# Patient Record
Sex: MALE | Race: WHITE | Employment: OTHER | URBAN - METROPOLITAN AREA
[De-identification: names, ages, dates, MRNs, and addresses within clinical notes are randomized per-mention and may not be internally consistent; named-entity substitution may affect disease eponyms.]

---

## 2018-09-24 ENCOUNTER — APPOINTMENT (OUTPATIENT)
Dept: CT IMAGING | Age: 78
DRG: 871 | End: 2018-09-24
Payer: MEDICARE

## 2018-09-24 ENCOUNTER — HOSPITAL ENCOUNTER (INPATIENT)
Age: 78
LOS: 8 days | Discharge: NURSING, REHAB OR LONG-TERM CARE | DRG: 871 | End: 2018-10-03
Attending: EMERGENCY MEDICINE | Admitting: INTERNAL MEDICINE
Payer: MEDICARE

## 2018-09-24 ENCOUNTER — APPOINTMENT (OUTPATIENT)
Dept: GENERAL RADIOLOGY | Age: 78
DRG: 871 | End: 2018-09-24
Payer: MEDICARE

## 2018-09-24 DIAGNOSIS — R41.82 ALTERED MENTAL STATUS, UNSPECIFIED ALTERED MENTAL STATUS TYPE: Primary | ICD-10-CM

## 2018-09-24 DIAGNOSIS — R40.4 TRANSIENT ALTERATION OF AWARENESS: ICD-10-CM

## 2018-09-24 LAB
ABO/RH: NORMAL
ABO/RH: NORMAL
ACETAMINOPHEN LEVEL: <5 UG/ML (ref 10–30)
ALBUMIN SERPL-MCNC: 2.8 G/DL (ref 3.4–5)
ALP BLD-CCNC: 121 U/L (ref 40–129)
ALT SERPL-CCNC: 22 U/L (ref 10–40)
AMMONIA: 50 UMOL/L (ref 16–60)
AMPHETAMINE SCREEN, URINE: NORMAL
ANTIBODY SCREEN: NORMAL
AST SERPL-CCNC: 75 U/L (ref 15–37)
B-TYPE NATRIURETIC PEPTIDE: 87 PG/ML (ref 0–99.9)
BARBITURATE SCREEN URINE: NORMAL
BASE EXCESS VENOUS: -13 (ref -3–3)
BASOPHILS ABSOLUTE: 0.1 K/UL (ref 0–0.2)
BASOPHILS RELATIVE PERCENT: 0.7 %
BENZODIAZEPINE SCREEN, URINE: NORMAL
BILIRUB SERPL-MCNC: 0.6 MG/DL (ref 0–1)
BILIRUBIN DIRECT: <0.2 MG/DL (ref 0–0.3)
BILIRUBIN URINE: ABNORMAL MG/DL
BILIRUBIN, INDIRECT: ABNORMAL MG/DL (ref 0–1)
BLOOD, URINE: ABNORMAL
CALCIUM IONIZED: 0.8 MMOL/L (ref 1.12–1.32)
CANNABINOID SCREEN URINE: NORMAL
CLARITY: ABNORMAL
CO2: 15 MMOL/L (ref 21–32)
COCAINE METABOLITE SCREEN URINE: NORMAL
COLOR: ABNORMAL
EOSINOPHILS ABSOLUTE: 0 K/UL (ref 0–0.6)
EOSINOPHILS RELATIVE PERCENT: 0 %
EPITHELIAL CELLS, UA: ABNORMAL /HPF
ETHANOL: NORMAL MG/DL (ref 0–0.08)
GFR AFRICAN AMERICAN: >60
GFR NON-AFRICAN AMERICAN: 59
GLUCOSE BLD-MCNC: 70 MG/DL (ref 70–99)
GLUCOSE URINE: 100 MG/DL
HCO3 VENOUS: 13.5 MMOL/L (ref 23–29)
HCT VFR BLD CALC: 45 % (ref 40.5–52.5)
HEMOGLOBIN: 14.4 G/DL (ref 13.5–17.5)
INR BLD: 0.93 (ref 0.86–1.14)
KETONES, URINE: >=160 MG/DL
LACTATE: 2.21 MMOL/L (ref 0.4–2)
LEUKOCYTE ESTERASE, URINE: NEGATIVE
LIPASE: 11 U/L (ref 13–60)
LYMPHOCYTES ABSOLUTE: 0.5 K/UL (ref 1–5.1)
LYMPHOCYTES RELATIVE PERCENT: 2.3 %
Lab: NORMAL
MAGNESIUM: 1.5 MG/DL (ref 1.8–2.4)
MCH RBC QN AUTO: 35.6 PG (ref 26–34)
MCHC RBC AUTO-ENTMCNC: 31.9 G/DL (ref 31–36)
MCV RBC AUTO: 111.4 FL (ref 80–100)
METHADONE SCREEN, URINE: NORMAL
MICROSCOPIC EXAMINATION: YES
MONOCYTES ABSOLUTE: 1.1 K/UL (ref 0–1.3)
MONOCYTES RELATIVE PERCENT: 5.2 %
NEUTROPHILS ABSOLUTE: 19.1 K/UL (ref 1.7–7.7)
NEUTROPHILS RELATIVE PERCENT: 91.8 %
NITRITE, URINE: NEGATIVE
O2 SAT, VEN: 46 %
OPIATE SCREEN URINE: NORMAL
OXYCODONE URINE: NORMAL
PCO2, VEN: 28.9 MM HG (ref 40–50)
PDW BLD-RTO: 15.1 % (ref 12.4–15.4)
PERFORMED ON: ABNORMAL
PERFORMED ON: ABNORMAL
PERFORMED ON: NORMAL
PERFORMED ON: NORMAL
PH UA: 5.5
PH UA: 6
PH VENOUS: 7.28 (ref 7.35–7.45)
PHENCYCLIDINE SCREEN URINE: NORMAL
PLATELET # BLD: 288 K/UL (ref 135–450)
PMV BLD AUTO: 8.6 FL (ref 5–10.5)
PO2, VEN: 28 MM HG
POC ANION GAP: 27 (ref 10–20)
POC BUN: 33 MG/DL (ref 7–18)
POC CHLORIDE: 101 MMOL/L (ref 99–110)
POC CREATININE: 1.2 MG/DL (ref 0.8–1.3)
POC POTASSIUM: 5.4 MMOL/L (ref 3.5–5.1)
POC SAMPLE TYPE: ABNORMAL
POC SAMPLE TYPE: ABNORMAL
POC SAMPLE TYPE: NORMAL
POC SAMPLE TYPE: NORMAL
POC SODIUM: 143 MMOL/L (ref 136–145)
POC TROPONIN I: 0.03 NG/ML (ref 0–0.1)
POTASSIUM SERPL-SCNC: 3.7 MMOL/L (ref 3.5–5.1)
PROPOXYPHENE SCREEN: NORMAL
PROTEIN UA: 100 MG/DL
PROTHROMBIN TIME: 10.6 SEC (ref 9.8–13)
RBC # BLD: 4.04 M/UL (ref 4.2–5.9)
RBC UA: ABNORMAL /HPF (ref 0–2)
SALICYLATE, SERUM: <0.3 MG/DL (ref 15–30)
SPECIFIC GRAVITY UA: 1.02
TCO2 CALC VENOUS: 14 MMOL/L
TOTAL CK: 1819 U/L (ref 39–308)
TOTAL PROTEIN: 5.6 G/DL (ref 6.4–8.2)
UROBILINOGEN, URINE: 0.2 E.U./DL
WBC # BLD: 20.8 K/UL (ref 4–11)
WBC UA: ABNORMAL /HPF (ref 0–5)

## 2018-09-24 PROCEDURE — 80047 BASIC METABLC PNL IONIZED CA: CPT

## 2018-09-24 PROCEDURE — 86850 RBC ANTIBODY SCREEN: CPT

## 2018-09-24 PROCEDURE — 71260 CT THORAX DX C+: CPT

## 2018-09-24 PROCEDURE — 96367 TX/PROPH/DG ADDL SEQ IV INF: CPT

## 2018-09-24 PROCEDURE — 2580000003 HC RX 258: Performed by: EMERGENCY MEDICINE

## 2018-09-24 PROCEDURE — 6360000004 HC RX CONTRAST MEDICATION: Performed by: EMERGENCY MEDICINE

## 2018-09-24 PROCEDURE — 96365 THER/PROPH/DIAG IV INF INIT: CPT

## 2018-09-24 PROCEDURE — 83690 ASSAY OF LIPASE: CPT

## 2018-09-24 PROCEDURE — G0480 DRUG TEST DEF 1-7 CLASSES: HCPCS

## 2018-09-24 PROCEDURE — 85610 PROTHROMBIN TIME: CPT

## 2018-09-24 PROCEDURE — 82140 ASSAY OF AMMONIA: CPT

## 2018-09-24 PROCEDURE — 71045 X-RAY EXAM CHEST 1 VIEW: CPT

## 2018-09-24 PROCEDURE — 87040 BLOOD CULTURE FOR BACTERIA: CPT

## 2018-09-24 PROCEDURE — 80076 HEPATIC FUNCTION PANEL: CPT

## 2018-09-24 PROCEDURE — 99291 CRITICAL CARE FIRST HOUR: CPT

## 2018-09-24 PROCEDURE — 81001 URINALYSIS AUTO W/SCOPE: CPT

## 2018-09-24 PROCEDURE — 84484 ASSAY OF TROPONIN QUANT: CPT

## 2018-09-24 PROCEDURE — 6360000002 HC RX W HCPCS: Performed by: EMERGENCY MEDICINE

## 2018-09-24 PROCEDURE — 74177 CT ABD & PELVIS W/CONTRAST: CPT

## 2018-09-24 PROCEDURE — 83735 ASSAY OF MAGNESIUM: CPT

## 2018-09-24 PROCEDURE — 83880 ASSAY OF NATRIURETIC PEPTIDE: CPT

## 2018-09-24 PROCEDURE — 83605 ASSAY OF LACTIC ACID: CPT

## 2018-09-24 PROCEDURE — 36415 COLL VENOUS BLD VENIPUNCTURE: CPT

## 2018-09-24 PROCEDURE — 82803 BLOOD GASES ANY COMBINATION: CPT

## 2018-09-24 PROCEDURE — 87086 URINE CULTURE/COLONY COUNT: CPT

## 2018-09-24 PROCEDURE — 86900 BLOOD TYPING SEROLOGIC ABO: CPT

## 2018-09-24 PROCEDURE — 84132 ASSAY OF SERUM POTASSIUM: CPT

## 2018-09-24 PROCEDURE — 85025 COMPLETE CBC W/AUTO DIFF WBC: CPT

## 2018-09-24 PROCEDURE — 70450 CT HEAD/BRAIN W/O DYE: CPT

## 2018-09-24 PROCEDURE — 80307 DRUG TEST PRSMV CHEM ANLYZR: CPT

## 2018-09-24 PROCEDURE — 86901 BLOOD TYPING SEROLOGIC RH(D): CPT

## 2018-09-24 PROCEDURE — 93005 ELECTROCARDIOGRAM TRACING: CPT | Performed by: EMERGENCY MEDICINE

## 2018-09-24 PROCEDURE — 82550 ASSAY OF CK (CPK): CPT

## 2018-09-24 PROCEDURE — 96368 THER/DIAG CONCURRENT INF: CPT

## 2018-09-24 RX ORDER — 0.9 % SODIUM CHLORIDE 0.9 %
30 INTRAVENOUS SOLUTION INTRAVENOUS ONCE
Status: COMPLETED | OUTPATIENT
Start: 2018-09-24 | End: 2018-09-25

## 2018-09-24 RX ORDER — MAGNESIUM SULFATE IN WATER 40 MG/ML
2 INJECTION, SOLUTION INTRAVENOUS ONCE
Status: COMPLETED | OUTPATIENT
Start: 2018-09-24 | End: 2018-09-25

## 2018-09-24 RX ORDER — SODIUM CHLORIDE 0.9 % (FLUSH) 0.9 %
10 SYRINGE (ML) INJECTION EVERY 12 HOURS SCHEDULED
Status: DISCONTINUED | OUTPATIENT
Start: 2018-09-24 | End: 2018-09-26 | Stop reason: SDUPTHER

## 2018-09-24 RX ORDER — SODIUM CHLORIDE 0.9 % (FLUSH) 0.9 %
10 SYRINGE (ML) INJECTION PRN
Status: DISCONTINUED | OUTPATIENT
Start: 2018-09-24 | End: 2018-09-26 | Stop reason: SDUPTHER

## 2018-09-24 RX ADMIN — SODIUM CHLORIDE 1803 ML: 9 INJECTION, SOLUTION INTRAVENOUS at 23:05

## 2018-09-24 RX ADMIN — PIPERACILLIN SODIUM AND TAZOBACTAM SODIUM 3.38 G: 3; .375 INJECTION, POWDER, LYOPHILIZED, FOR SOLUTION INTRAVENOUS at 23:06

## 2018-09-24 RX ADMIN — VANCOMYCIN HYDROCHLORIDE 1250 MG: 10 INJECTION, POWDER, LYOPHILIZED, FOR SOLUTION INTRAVENOUS at 23:53

## 2018-09-24 RX ADMIN — MAGNESIUM SULFATE HEPTAHYDRATE 2 G: 40 INJECTION, SOLUTION INTRAVENOUS at 23:53

## 2018-09-24 RX ADMIN — IOPAMIDOL 80 ML: 755 INJECTION, SOLUTION INTRAVENOUS at 23:25

## 2018-09-25 PROBLEM — A41.9 SEPSIS (HCC): Status: ACTIVE | Noted: 2018-09-25

## 2018-09-25 LAB
ALBUMIN SERPL-MCNC: 2.3 G/DL (ref 3.4–5)
ALBUMIN SERPL-MCNC: 2.5 G/DL (ref 3.4–5)
ALBUMIN SERPL-MCNC: 2.6 G/DL (ref 3.4–5)
ALP BLD-CCNC: 92 U/L (ref 40–129)
ALT SERPL-CCNC: 21 U/L (ref 10–40)
ANION GAP SERPL CALCULATED.3IONS-SCNC: 19 MMOL/L (ref 3–16)
ANION GAP SERPL CALCULATED.3IONS-SCNC: 37 MMOL/L (ref 3–16)
ANION GAP SERPL CALCULATED.3IONS-SCNC: 38 MMOL/L (ref 3–16)
ANION GAP SERPL CALCULATED.3IONS-SCNC: 39 MMOL/L (ref 3–16)
AST SERPL-CCNC: 63 U/L (ref 15–37)
BASE EXCESS ARTERIAL: -12.3 MMOL/L (ref -3–3)
BASOPHILS ABSOLUTE: 0 K/UL (ref 0–0.2)
BASOPHILS ABSOLUTE: 0 K/UL (ref 0–0.2)
BASOPHILS RELATIVE PERCENT: 0.1 %
BASOPHILS RELATIVE PERCENT: 0.3 %
BETA-HYDROXYBUTYRATE: >8 MMOL/L (ref 0–0.27)
BILIRUB SERPL-MCNC: 0.5 MG/DL (ref 0–1)
BILIRUBIN DIRECT: <0.2 MG/DL (ref 0–0.3)
BILIRUBIN, INDIRECT: ABNORMAL MG/DL (ref 0–1)
BUN BLDV-MCNC: 25 MG/DL (ref 7–20)
BUN BLDV-MCNC: 26 MG/DL (ref 7–20)
BUN BLDV-MCNC: 26 MG/DL (ref 7–20)
BUN BLDV-MCNC: 27 MG/DL (ref 7–20)
CALCIUM IONIZED: 0.8 MMOL/L (ref 1.12–1.32)
CALCIUM IONIZED: 0.93 MMOL/L (ref 1.12–1.32)
CALCIUM SERPL-MCNC: 6.4 MG/DL (ref 8.3–10.6)
CALCIUM SERPL-MCNC: 6.8 MG/DL (ref 8.3–10.6)
CALCIUM SERPL-MCNC: 6.9 MG/DL (ref 8.3–10.6)
CALCIUM SERPL-MCNC: 6.9 MG/DL (ref 8.3–10.6)
CARBOXYHEMOGLOBIN ARTERIAL: 2.3 % (ref 0–1.5)
CHLORIDE BLD-SCNC: 96 MMOL/L (ref 99–110)
CHLORIDE BLD-SCNC: 97 MMOL/L (ref 99–110)
CHOLESTEROL, TOTAL: 78 MG/DL (ref 0–199)
CO2: 12 MMOL/L (ref 21–32)
CO2: 12 MMOL/L (ref 21–32)
CO2: 14 MMOL/L (ref 21–32)
CO2: 29 MMOL/L (ref 21–32)
CREAT SERPL-MCNC: 1.5 MG/DL (ref 0.8–1.3)
CREATININE URINE: 78 MG/DL (ref 39–259)
EKG ATRIAL RATE: 118 BPM
EKG ATRIAL RATE: 127 BPM
EKG DIAGNOSIS: NORMAL
EKG DIAGNOSIS: NORMAL
EKG P AXIS: 82 DEGREES
EKG P-R INTERVAL: 128 MS
EKG Q-T INTERVAL: 302 MS
EKG Q-T INTERVAL: 456 MS
EKG QRS DURATION: 70 MS
EKG QRS DURATION: 74 MS
EKG QTC CALCULATION (BAZETT): 438 MS
EKG QTC CALCULATION (BAZETT): 639 MS
EKG R AXIS: 62 DEGREES
EKG R AXIS: 80 DEGREES
EKG T AXIS: -78 DEGREES
EKG T AXIS: 19 DEGREES
EKG VENTRICULAR RATE: 118 BPM
EKG VENTRICULAR RATE: 127 BPM
EOSINOPHILS ABSOLUTE: 0 K/UL (ref 0–0.6)
EOSINOPHILS ABSOLUTE: 0 K/UL (ref 0–0.6)
EOSINOPHILS RELATIVE PERCENT: 0 %
EOSINOPHILS RELATIVE PERCENT: 0 %
FOLATE: 12.66 NG/ML (ref 4.78–24.2)
FOLATE: 13.38 NG/ML (ref 4.78–24.2)
GAMMA GLUTAMYL TRANSFERASE: 21 U/L (ref 8–61)
GFR AFRICAN AMERICAN: 55
GFR NON-AFRICAN AMERICAN: 45
GLUCOSE BLD-MCNC: 102 MG/DL (ref 70–99)
GLUCOSE BLD-MCNC: 122 MG/DL (ref 70–99)
GLUCOSE BLD-MCNC: 148 MG/DL (ref 70–99)
GLUCOSE BLD-MCNC: 148 MG/DL (ref 70–99)
GLUCOSE BLD-MCNC: 156 MG/DL (ref 70–99)
GLUCOSE BLD-MCNC: 161 MG/DL (ref 70–99)
GLUCOSE BLD-MCNC: 204 MG/DL (ref 70–99)
GLUCOSE BLD-MCNC: 98 MG/DL (ref 70–99)
GLUCOSE URINE: 4 MG/DL (ref 6–20)
HCO3 ARTERIAL: 11 MMOL/L (ref 21–29)
HCT VFR BLD CALC: 38 % (ref 40.5–52.5)
HCT VFR BLD CALC: 38.4 % (ref 40.5–52.5)
HDLC SERPL-MCNC: 28 MG/DL (ref 40–60)
HEMOGLOBIN, ART, EXTENDED: 13.5 G/DL
HEMOGLOBIN: 12.9 G/DL (ref 13.5–17.5)
HEMOGLOBIN: 13 G/DL (ref 13.5–17.5)
LACTATE: 1.51 MMOL/L (ref 0.4–2)
LDL CHOLESTEROL CALCULATED: 29 MG/DL
LV EF: 65 %
LVEF MODALITY: NORMAL
LYMPHOCYTES ABSOLUTE: 0.5 K/UL (ref 1–5.1)
LYMPHOCYTES ABSOLUTE: 0.7 K/UL (ref 1–5.1)
LYMPHOCYTES RELATIVE PERCENT: 3.2 %
LYMPHOCYTES RELATIVE PERCENT: 4.8 %
MAGNESIUM: 1.9 MG/DL (ref 1.8–2.4)
MAGNESIUM: 2.2 MG/DL (ref 1.8–2.4)
MAGNESIUM: 2.7 MG/DL (ref 1.8–2.4)
MCH RBC QN AUTO: 35.9 PG (ref 26–34)
MCH RBC QN AUTO: 36.1 PG (ref 26–34)
MCHC RBC AUTO-ENTMCNC: 33.8 G/DL (ref 31–36)
MCHC RBC AUTO-ENTMCNC: 33.9 G/DL (ref 31–36)
MCV RBC AUTO: 106.5 FL (ref 80–100)
MCV RBC AUTO: 106.5 FL (ref 80–100)
METHEMOGLOBIN ARTERIAL: 0.2 % (ref 0–1.4)
MONOCYTES ABSOLUTE: 0.6 K/UL (ref 0–1.3)
MONOCYTES ABSOLUTE: 0.6 K/UL (ref 0–1.3)
MONOCYTES RELATIVE PERCENT: 4.3 %
MONOCYTES RELATIVE PERCENT: 4.4 %
NEUTROPHILS ABSOLUTE: 13.3 K/UL (ref 1.7–7.7)
NEUTROPHILS ABSOLUTE: 13.4 K/UL (ref 1.7–7.7)
NEUTROPHILS RELATIVE PERCENT: 90.5 %
NEUTROPHILS RELATIVE PERCENT: 92.4 %
O2 SAT, ARTERIAL: 95 % (ref 93–100)
OSMOLALITY: 318 MOSM/KG (ref 278–305)
PCO2 ARTERIAL: 20.4 MMHG (ref 35–45)
PDW BLD-RTO: 14.5 % (ref 12.4–15.4)
PDW BLD-RTO: 14.6 % (ref 12.4–15.4)
PERFORMED ON: ABNORMAL
PERFORMED ON: NORMAL
PH ARTERIAL: 7.36 (ref 7.35–7.45)
PH VENOUS: 7.41 (ref 7.35–7.45)
PHOSPHORUS: 2.9 MG/DL (ref 2.5–4.9)
PHOSPHORUS: 4.5 MG/DL (ref 2.5–4.9)
PHOSPHORUS: 4.5 MG/DL (ref 2.5–4.9)
PHOSPHORUS: 4.9 MG/DL (ref 2.5–4.9)
PLATELET # BLD: 251 K/UL (ref 135–450)
PLATELET # BLD: 273 K/UL (ref 135–450)
PMV BLD AUTO: 8 FL (ref 5–10.5)
PMV BLD AUTO: 8.3 FL (ref 5–10.5)
PO2 ARTERIAL: 75.5 MMHG (ref 75–108)
POC SAMPLE TYPE: NORMAL
POTASSIUM REFLEX MAGNESIUM: 2.6 MMOL/L (ref 3.5–5.1)
POTASSIUM REFLEX MAGNESIUM: 3.1 MMOL/L (ref 3.5–5.1)
POTASSIUM SERPL-SCNC: 2.4 MMOL/L (ref 3.5–5.1)
POTASSIUM SERPL-SCNC: 2.6 MMOL/L (ref 3.5–5.1)
POTASSIUM SERPL-SCNC: 2.7 MMOL/L (ref 3.5–5.1)
POTASSIUM, UR: 26.5 MMOL/L
PROTEIN PROTEIN: 20.1 MG/DL
PROTEIN/CREAT RATIO: 0.3 MG/DL
RBC # BLD: 3.57 M/UL (ref 4.2–5.9)
RBC # BLD: 3.61 M/UL (ref 4.2–5.9)
SODIUM BLD-SCNC: 145 MMOL/L (ref 136–145)
SODIUM BLD-SCNC: 147 MMOL/L (ref 136–145)
SODIUM BLD-SCNC: 147 MMOL/L (ref 136–145)
SODIUM BLD-SCNC: 148 MMOL/L (ref 136–145)
SODIUM URINE: 77 MMOL/L
T4 FREE: 0.8 NG/DL (ref 0.9–1.8)
TCO2 ARTERIAL: 12 MMOL/L
TOTAL CK: 1259 U/L (ref 39–308)
TOTAL PROTEIN: 4.6 G/DL (ref 6.4–8.2)
TRIGL SERPL-MCNC: 105 MG/DL (ref 0–150)
TROPONIN: 0.04 NG/ML
TROPONIN: 0.05 NG/ML
TROPONIN: 0.08 NG/ML
TSH REFLEX: 0.57 UIU/ML (ref 0.27–4.2)
UREA NITROGEN, UR: 356 MG/DL (ref 800–1666)
URIC ACID, SERUM: 12.5 MG/DL (ref 3.5–7.2)
VITAMIN B-12: 1294 PG/ML (ref 211–911)
VITAMIN B-12: 1541 PG/ML (ref 211–911)
VLDLC SERPL CALC-MCNC: 21 MG/DL
WBC # BLD: 14.5 K/UL (ref 4–11)
WBC # BLD: 14.7 K/UL (ref 4–11)

## 2018-09-25 PROCEDURE — 6360000002 HC RX W HCPCS: Performed by: STUDENT IN AN ORGANIZED HEALTH CARE EDUCATION/TRAINING PROGRAM

## 2018-09-25 PROCEDURE — 82607 VITAMIN B-12: CPT

## 2018-09-25 PROCEDURE — 82330 ASSAY OF CALCIUM: CPT

## 2018-09-25 PROCEDURE — 83735 ASSAY OF MAGNESIUM: CPT

## 2018-09-25 PROCEDURE — 2580000003 HC RX 258: Performed by: INTERNAL MEDICINE

## 2018-09-25 PROCEDURE — 85025 COMPLETE CBC W/AUTO DIFF WBC: CPT

## 2018-09-25 PROCEDURE — 82550 ASSAY OF CK (CPK): CPT

## 2018-09-25 PROCEDURE — 84132 ASSAY OF SERUM POTASSIUM: CPT

## 2018-09-25 PROCEDURE — 2500000003 HC RX 250 WO HCPCS: Performed by: STUDENT IN AN ORGANIZED HEALTH CARE EDUCATION/TRAINING PROGRAM

## 2018-09-25 PROCEDURE — 2500000003 HC RX 250 WO HCPCS: Performed by: INTERNAL MEDICINE

## 2018-09-25 PROCEDURE — G8996 SWALLOW CURRENT STATUS: HCPCS

## 2018-09-25 PROCEDURE — 92610 EVALUATE SWALLOWING FUNCTION: CPT

## 2018-09-25 PROCEDURE — 36415 COLL VENOUS BLD VENIPUNCTURE: CPT

## 2018-09-25 PROCEDURE — 84100 ASSAY OF PHOSPHORUS: CPT

## 2018-09-25 PROCEDURE — 83605 ASSAY OF LACTIC ACID: CPT

## 2018-09-25 PROCEDURE — 36600 WITHDRAWAL OF ARTERIAL BLOOD: CPT

## 2018-09-25 PROCEDURE — 84300 ASSAY OF URINE SODIUM: CPT

## 2018-09-25 PROCEDURE — 80069 RENAL FUNCTION PANEL: CPT

## 2018-09-25 PROCEDURE — 82977 ASSAY OF GGT: CPT

## 2018-09-25 PROCEDURE — 80061 LIPID PANEL: CPT

## 2018-09-25 PROCEDURE — 6360000002 HC RX W HCPCS: Performed by: INTERNAL MEDICINE

## 2018-09-25 PROCEDURE — 2060000000 HC ICU INTERMEDIATE R&B

## 2018-09-25 PROCEDURE — 84439 ASSAY OF FREE THYROXINE: CPT

## 2018-09-25 PROCEDURE — 93005 ELECTROCARDIOGRAM TRACING: CPT | Performed by: STUDENT IN AN ORGANIZED HEALTH CARE EDUCATION/TRAINING PROGRAM

## 2018-09-25 PROCEDURE — 82746 ASSAY OF FOLIC ACID SERUM: CPT

## 2018-09-25 PROCEDURE — 82570 ASSAY OF URINE CREATININE: CPT

## 2018-09-25 PROCEDURE — 80076 HEPATIC FUNCTION PANEL: CPT

## 2018-09-25 PROCEDURE — 83930 ASSAY OF BLOOD OSMOLALITY: CPT

## 2018-09-25 PROCEDURE — 84540 ASSAY OF URINE/UREA-N: CPT

## 2018-09-25 PROCEDURE — G8997 SWALLOW GOAL STATUS: HCPCS

## 2018-09-25 PROCEDURE — 2580000003 HC RX 258: Performed by: FAMILY MEDICINE

## 2018-09-25 PROCEDURE — 6370000000 HC RX 637 (ALT 250 FOR IP): Performed by: INTERNAL MEDICINE

## 2018-09-25 PROCEDURE — 2580000003 HC RX 258: Performed by: EMERGENCY MEDICINE

## 2018-09-25 PROCEDURE — 2580000003 HC RX 258: Performed by: STUDENT IN AN ORGANIZED HEALTH CARE EDUCATION/TRAINING PROGRAM

## 2018-09-25 PROCEDURE — 93306 TTE W/DOPPLER COMPLETE: CPT

## 2018-09-25 PROCEDURE — 84133 ASSAY OF URINE POTASSIUM: CPT

## 2018-09-25 PROCEDURE — 94760 N-INVAS EAR/PLS OXIMETRY 1: CPT

## 2018-09-25 PROCEDURE — 84550 ASSAY OF BLOOD/URIC ACID: CPT

## 2018-09-25 PROCEDURE — 82803 BLOOD GASES ANY COMBINATION: CPT

## 2018-09-25 PROCEDURE — 84484 ASSAY OF TROPONIN QUANT: CPT

## 2018-09-25 PROCEDURE — 84156 ASSAY OF PROTEIN URINE: CPT

## 2018-09-25 PROCEDURE — 81003 URINALYSIS AUTO W/O SCOPE: CPT

## 2018-09-25 PROCEDURE — 96366 THER/PROPH/DIAG IV INF ADDON: CPT

## 2018-09-25 PROCEDURE — 82693 ASSAY OF ETHYLENE GLYCOL: CPT

## 2018-09-25 PROCEDURE — 84443 ASSAY THYROID STIM HORMONE: CPT

## 2018-09-25 PROCEDURE — 82010 KETONE BODYS QUAN: CPT

## 2018-09-25 RX ORDER — POTASSIUM CHLORIDE 7.45 MG/ML
20 INJECTION INTRAVENOUS ONCE
Status: DISCONTINUED | OUTPATIENT
Start: 2018-09-25 | End: 2018-09-25

## 2018-09-25 RX ORDER — DEXTROSE MONOHYDRATE 25 G/50ML
12.5 INJECTION, SOLUTION INTRAVENOUS PRN
Status: DISCONTINUED | OUTPATIENT
Start: 2018-09-25 | End: 2018-10-03 | Stop reason: HOSPADM

## 2018-09-25 RX ORDER — SODIUM CHLORIDE 0.9 % (FLUSH) 0.9 %
10 SYRINGE (ML) INJECTION EVERY 12 HOURS SCHEDULED
Status: DISCONTINUED | OUTPATIENT
Start: 2018-09-25 | End: 2018-10-03 | Stop reason: HOSPADM

## 2018-09-25 RX ORDER — DEXTROSE, SODIUM CHLORIDE, AND POTASSIUM CHLORIDE 5; .45; .3 G/100ML; G/100ML; G/100ML
INJECTION INTRAVENOUS CONTINUOUS
Status: DISCONTINUED | OUTPATIENT
Start: 2018-09-25 | End: 2018-09-26

## 2018-09-25 RX ORDER — DEXTROSE, SODIUM CHLORIDE, AND POTASSIUM CHLORIDE 5; .45; .15 G/100ML; G/100ML; G/100ML
INJECTION INTRAVENOUS CONTINUOUS
Status: DISCONTINUED | OUTPATIENT
Start: 2018-09-25 | End: 2018-09-25

## 2018-09-25 RX ORDER — ALLOPURINOL 300 MG/1
300 TABLET ORAL DAILY
COMMUNITY

## 2018-09-25 RX ORDER — LORAZEPAM 2 MG/ML
3 INJECTION INTRAMUSCULAR
Status: DISCONTINUED | OUTPATIENT
Start: 2018-09-25 | End: 2018-09-27

## 2018-09-25 RX ORDER — SODIUM CHLORIDE 0.9 % (FLUSH) 0.9 %
10 SYRINGE (ML) INJECTION PRN
Status: DISCONTINUED | OUTPATIENT
Start: 2018-09-25 | End: 2018-10-03 | Stop reason: HOSPADM

## 2018-09-25 RX ORDER — LORAZEPAM 2 MG/ML
2 INJECTION INTRAMUSCULAR
Status: DISCONTINUED | OUTPATIENT
Start: 2018-09-25 | End: 2018-09-27

## 2018-09-25 RX ORDER — ONDANSETRON 2 MG/ML
4 INJECTION INTRAMUSCULAR; INTRAVENOUS EVERY 6 HOURS PRN
Status: DISCONTINUED | OUTPATIENT
Start: 2018-09-25 | End: 2018-09-29

## 2018-09-25 RX ORDER — LORAZEPAM 1 MG/1
4 TABLET ORAL
Status: DISCONTINUED | OUTPATIENT
Start: 2018-09-25 | End: 2018-09-27

## 2018-09-25 RX ORDER — MAGNESIUM SULFATE IN WATER 40 MG/ML
2 INJECTION, SOLUTION INTRAVENOUS
Status: DISPENSED | OUTPATIENT
Start: 2018-09-25 | End: 2018-09-25

## 2018-09-25 RX ORDER — FOLIC ACID 1 MG/1
1 TABLET ORAL DAILY
COMMUNITY

## 2018-09-25 RX ORDER — LISINOPRIL AND HYDROCHLOROTHIAZIDE 25; 20 MG/1; MG/1
1 TABLET ORAL DAILY
Status: ON HOLD | COMMUNITY
End: 2018-10-03 | Stop reason: HOSPADM

## 2018-09-25 RX ORDER — LORAZEPAM 2 MG/ML
1 INJECTION INTRAMUSCULAR
Status: DISCONTINUED | OUTPATIENT
Start: 2018-09-25 | End: 2018-09-27

## 2018-09-25 RX ORDER — HEPARIN SODIUM 5000 [USP'U]/ML
5000 INJECTION, SOLUTION INTRAVENOUS; SUBCUTANEOUS EVERY 8 HOURS SCHEDULED
Status: DISCONTINUED | OUTPATIENT
Start: 2018-09-25 | End: 2018-10-03 | Stop reason: HOSPADM

## 2018-09-25 RX ORDER — SODIUM CHLORIDE 9 MG/ML
INJECTION, SOLUTION INTRAVENOUS CONTINUOUS
Status: DISCONTINUED | OUTPATIENT
Start: 2018-09-25 | End: 2018-09-25

## 2018-09-25 RX ORDER — NICOTINE POLACRILEX 4 MG
15 LOZENGE BUCCAL PRN
Status: DISCONTINUED | OUTPATIENT
Start: 2018-09-25 | End: 2018-10-03 | Stop reason: HOSPADM

## 2018-09-25 RX ORDER — POTASSIUM CHLORIDE 7.45 MG/ML
10 INJECTION INTRAVENOUS
Status: COMPLETED | OUTPATIENT
Start: 2018-09-25 | End: 2018-09-25

## 2018-09-25 RX ORDER — METOPROLOL TARTRATE 5 MG/5ML
2.5 INJECTION INTRAVENOUS EVERY 5 MIN PRN
Status: DISCONTINUED | OUTPATIENT
Start: 2018-09-25 | End: 2018-09-27

## 2018-09-25 RX ORDER — LORAZEPAM 1 MG/1
2 TABLET ORAL
Status: DISCONTINUED | OUTPATIENT
Start: 2018-09-25 | End: 2018-09-27

## 2018-09-25 RX ORDER — LORAZEPAM 1 MG/1
1 TABLET ORAL
Status: DISCONTINUED | OUTPATIENT
Start: 2018-09-25 | End: 2018-09-27

## 2018-09-25 RX ORDER — METOPROLOL TARTRATE 5 MG/5ML
5 INJECTION INTRAVENOUS EVERY 5 MIN PRN
Status: DISCONTINUED | OUTPATIENT
Start: 2018-09-25 | End: 2018-09-25

## 2018-09-25 RX ORDER — DEXTROSE MONOHYDRATE 50 MG/ML
100 INJECTION, SOLUTION INTRAVENOUS PRN
Status: DISCONTINUED | OUTPATIENT
Start: 2018-09-25 | End: 2018-10-03 | Stop reason: HOSPADM

## 2018-09-25 RX ORDER — LORAZEPAM 1 MG/1
3 TABLET ORAL
Status: DISCONTINUED | OUTPATIENT
Start: 2018-09-25 | End: 2018-09-27

## 2018-09-25 RX ORDER — POTASSIUM CHLORIDE 7.45 MG/ML
10 INJECTION INTRAVENOUS
Status: DISPENSED | OUTPATIENT
Start: 2018-09-25 | End: 2018-09-25

## 2018-09-25 RX ORDER — POTASSIUM CHLORIDE 20 MEQ/1
40 TABLET, EXTENDED RELEASE ORAL ONCE
Status: DISCONTINUED | OUTPATIENT
Start: 2018-09-25 | End: 2018-09-25

## 2018-09-25 RX ORDER — LORAZEPAM 2 MG/ML
4 INJECTION INTRAMUSCULAR
Status: DISCONTINUED | OUTPATIENT
Start: 2018-09-25 | End: 2018-09-27

## 2018-09-25 RX ADMIN — Medication 10 ML: at 21:25

## 2018-09-25 RX ADMIN — POTASSIUM CHLORIDE 10 MEQ: 10 INJECTION, SOLUTION INTRAVENOUS at 08:55

## 2018-09-25 RX ADMIN — PIPERACILLIN SODIUM AND TAZOBACTAM SODIUM 3.38 G: 3; .375 INJECTION, POWDER, LYOPHILIZED, FOR SOLUTION INTRAVENOUS at 13:51

## 2018-09-25 RX ADMIN — POTASSIUM CHLORIDE, DEXTROSE MONOHYDRATE AND SODIUM CHLORIDE: 300; 5; 450 INJECTION, SOLUTION INTRAVENOUS at 18:54

## 2018-09-25 RX ADMIN — HEPARIN SODIUM 5000 UNITS: 5000 INJECTION INTRAVENOUS; SUBCUTANEOUS at 17:08

## 2018-09-25 RX ADMIN — PIPERACILLIN SODIUM AND TAZOBACTAM SODIUM 3.38 G: 3; .375 INJECTION, POWDER, LYOPHILIZED, FOR SOLUTION INTRAVENOUS at 04:43

## 2018-09-25 RX ADMIN — POTASSIUM CHLORIDE 10 MEQ: 10 INJECTION, SOLUTION INTRAVENOUS at 06:38

## 2018-09-25 RX ADMIN — POTASSIUM CHLORIDE 10 MEQ: 10 INJECTION, SOLUTION INTRAVENOUS at 07:51

## 2018-09-25 RX ADMIN — CALCIUM GLUCONATE 2 G: 98 INJECTION, SOLUTION INTRAVENOUS at 08:06

## 2018-09-25 RX ADMIN — PYRIDOXINE HYDROCHLORIDE 50 MG: 100 INJECTION, SOLUTION INTRAMUSCULAR; INTRAVENOUS at 10:41

## 2018-09-25 RX ADMIN — POTASSIUM CHLORIDE 10 MEQ: 10 INJECTION, SOLUTION INTRAVENOUS at 20:46

## 2018-09-25 RX ADMIN — FOLIC ACID: 5 INJECTION, SOLUTION INTRAMUSCULAR; INTRAVENOUS; SUBCUTANEOUS at 05:39

## 2018-09-25 RX ADMIN — HEPARIN SODIUM 5000 UNITS: 5000 INJECTION INTRAVENOUS; SUBCUTANEOUS at 22:01

## 2018-09-25 RX ADMIN — CALCIUM GLUCONATE 2 G: 98 INJECTION, SOLUTION INTRAVENOUS at 18:54

## 2018-09-25 RX ADMIN — INSULIN LISPRO 2 UNITS: 100 INJECTION, SOLUTION INTRAVENOUS; SUBCUTANEOUS at 21:19

## 2018-09-25 RX ADMIN — SODIUM BICARBONATE: 84 INJECTION, SOLUTION INTRAVENOUS at 07:49

## 2018-09-25 RX ADMIN — INSULIN LISPRO 1 UNITS: 100 INJECTION, SOLUTION INTRAVENOUS; SUBCUTANEOUS at 18:54

## 2018-09-25 RX ADMIN — Medication 10 ML: at 21:19

## 2018-09-25 RX ADMIN — MAGNESIUM SULFATE HEPTAHYDRATE 2 G: 40 INJECTION, SOLUTION INTRAVENOUS at 08:58

## 2018-09-25 RX ADMIN — SODIUM CHLORIDE: 9 INJECTION, SOLUTION INTRAVENOUS at 04:43

## 2018-09-25 RX ADMIN — MAGNESIUM SULFATE HEPTAHYDRATE 2 G: 40 INJECTION, SOLUTION INTRAVENOUS at 10:35

## 2018-09-25 RX ADMIN — POTASSIUM CHLORIDE: 2 INJECTION, SOLUTION, CONCENTRATE INTRAVENOUS at 12:03

## 2018-09-25 RX ADMIN — POTASSIUM CHLORIDE 10 MEQ: 10 INJECTION, SOLUTION INTRAVENOUS at 23:53

## 2018-09-25 RX ADMIN — PIPERACILLIN SODIUM AND TAZOBACTAM SODIUM 3.38 G: 3; .375 INJECTION, POWDER, LYOPHILIZED, FOR SOLUTION INTRAVENOUS at 20:46

## 2018-09-25 RX ADMIN — INSULIN LISPRO 1 UNITS: 100 INJECTION, SOLUTION INTRAVENOUS; SUBCUTANEOUS at 14:54

## 2018-09-25 RX ADMIN — Medication 10 ML: at 21:24

## 2018-09-25 ASSESSMENT — PAIN SCALES - GENERAL
PAINLEVEL_OUTOF10: 0

## 2018-09-25 NOTE — CONSULTS
will follow up ethylene glycol levels  - added urine glucose and urea to urine studies for osmolal gap  - replace bicarbonate deficit (287), currently on NaHCO3 150 in D5 @ 125  - monitor potassium and replace  - monitor urine output    Felisa Gaona, PGY-2  Internal medicine resident     Will discuss with attending, Dr. Kameron Stock.      Thank you for allowing us to participate in care of Kindra Lewis free to contact me   [facility] [address]  Office : [phone]  Fax :[phone]

## 2018-09-25 NOTE — PROGRESS NOTES
4 Eyes Admission Assessment     I agree as the admission nurse that 2 RN's have performed a thorough Head to Toe Skin Assessment on the patient. ALL assessment sites listed below have been assessed on admission. Areas assessed by both nurses:   [x]   Head, Face, and Ears   [x]   Shoulders, Back, and Chest  [x]   Arms, Elbows, and Hands   [x]   Coccyx, Sacrum, and Ischum  [x]   Legs, Feet, and Heels        Does the Patient have Skin Breakdown?   Yes a wound was noted on the Admission Assessment and an LDA was Initiated documentation include the Maricruz-wound, Wound Assessment, Measurements, Dressing Treatment, Drainage, and Color\",   Abrasion to right buttocks, left shoulder and bruise/abrasion to back of head        Ronaldo Prevention initiated:  Yes   Wound Care Orders initiated:  NA      [facility] nurse consulted for Pressure Injury (Stage 3,4, Unstageable, DTI, NWPT, and Complex wounds):  NA      Nurse 1 eSignature: Electronically signed by Adelso Chapa RN on 9/25/18 at 2:30 PM    **SHARE this note so that the co-signing nurse is able to place an eSignature**    Nurse 2 eSignature: Electronically signed by Izabella Rangel RN on 9/25/18 at 5:26 PM

## 2018-09-25 NOTE — ED NOTES
Patient brought by EMS from home for possible fall. Patient found by friends on the floor. Patient states he did not fall but is unsure how he ended on the floor. Patient last seen by friends on Friday, unsure how long he was on floor. Edema and pressure ulcer to top of head. Abrasions to back and right shoulder. EKG completed on arrival to ED. Patient covered in stool, cleaned and may care provided.       Mari Potter RN  09/24/18 2200

## 2018-09-25 NOTE — H&P
Internal Medicine PGY-1 Resident History & Physical      PCP: Noa Dan    Date of Admission: 2018    Chief Complaint:  Found down     History Of Present Illness:      Mr. Bahman Cunningham is a 69 y.o. male with PMH of HTN, gout, and alcoholism who presents to ED from home via EMS after being found down at home for unknown length of time. Patient has friend who normally talks with Mr. Jaswinder Allen daily, but he had not heard from him since Friday so he called police to check on Mr Jaswinder Allen. Upon entering Mr Holcomb's home he was found down, sitting up, on the floor. No external injuries were visible but patient could not remember falling to the floor or how long he had been down. Upon presentation to the ED patient was unable to provide any history of the incident that led to his presentation today. Difficult to understand and does not make sense, and did not recognize friend when he came to see Mr. Jaswinder Allen. ED Course: HoThermic at presentation, 94.6F, and sinus tachycardia >> warming blanket applied. Ethanol and UDS negative. VBG shows metabolic acidosis with pH of 7.275 / pCO2 28.9 / HCO3 13.5. POC-BMP shows AG of 27 with blood glucose of 70. HyK of 5.6, without EKG changes, and HoMg at 1.5. Lactate 2.21 and leukocytosis of 20.8. Of note, CBC shows macrocytosis. UA negative for infection. CK also elevated at 1.8K, which declined to 1,259 with IVF. Pan-scaned with CT head & chest & A/P showin. Low-density fluid collection in scalp representing hematoma vs cellulitis & edema    2. B/l upper lobe nodules that require follow-up    3. Possible rectosigmoid colitis   4. Cholelithiasis w/ GB distension     Admitted for management of AG metabolic acidosis and leukocytosis of unclear etiology at this time. Started on Vanc and Zosyn in ED. S/p 30ml/kg bolus of NS. Past Medical History:      History reviewed. No pertinent past medical history. Past Surgical History:      History reviewed.  No pertinent surgical

## 2018-09-25 NOTE — CARE COORDINATION
Pt is from home alone. He is active with Adult Protective Services. According to [facility], Georges Kanner, pt was brought to their attention in August after an episode in which pt was driving to [place] but ended up in [place] in [place]. Pt has poor short term memory and limited remote memory at baseline. He is also generally confused. SW had tried to get pt to an appointment with his PCP, Dr. Robbin Desai, for an evaluation. Pt made the appointment but then refused to follow through. He is quite vulnerable in the community. He has no family and is supported by a few friends and his neighbor. He is ADL independent and drives (although safety in question). His neighbor has been helping him with grocery shopping. Pt eats only frozen meals, drinks \"a lot\" of alcohol, and smokes. Due to safety concerns, APS has been involved trying to get an expert evaluation with regard to capacity as patient is in need of a guardian for person and estate. Emergency Contacts:   Samina Telles, [phone] (W). Fransisca is the one who found pt down this morning. She may also be willing to accept guardianship. Georges Kanner, [facility], [phone]    Discharge Planning: Barriers-     Pt is probably not safe to return home. He needs an expert evaluation for capacity to facilitate guardianship. Please remain in contact with APS so they can expedite the process with the court. SNF v home D/C plan.      Yeimy Stover RN  Case Management  [phone]

## 2018-09-25 NOTE — PROGRESS NOTES
Vision/Hearing  Vision  Vision:  (difficult to determine)  Hearing  Hearing: Exceptions to [facility]  Hearing Exceptions: Hard of hearing/hearing concerns    Oral Motor Deficits    unable to assess ROM and strength due to inability to follow commands, but no facial droop observed. Oral Phase Dysfunction    Pt required cues to close lips around the spoon to take ice chip. Once ice chip was in his mouth, pt required cues for mastication. Pt required cues for appropriate use of a straw- initially bit down on straw, but was eventually able to draw a small amount of water up the straw. Once the water was in his mouth, pt proceeded to swish it around and eventually swallow. Indicators of Pharyngeal Phase Dysfunction     pt with delayed swallow initiation with ice chip then noted throat clearing. With sip of water, pt produced wet, audible, multiple swallows followed by coughing. Pt with neck hyperextended which likely decreased airway protection. Did not present pt with further PO trials due to concern for Pt safety. Pt is not safe for po intake. Prognosis  Prognosis  Prognosis for safe diet advancement: fair  Barriers to reach goals: cognitive deficits  Individuals consulted  Consulted and agree with results and recommendations: Patient;RN    Education  Patient Education: role of SLP  Patient Education Response: No evidence of learning      G-Code  SLP G-Codes  Functional Limitations: Swallowing  Swallow Current Status (): 100 percent impaired, limited or restricted  Swallow Goal Status (): At least 80 percent but less than 100 percent impaired, limited or restricted         Therapy Time  SLP Individual Minutes  Time In: 2117  Time Out: 5206  Minutes: 20         Pt's goal: pt unable to state      Plan:  Continue goals per POC  Recommended diet:con't NPO with oral care with suction  Will re-eval again tomorrow   Total treatment time:20  Pt's discharge plan:pt unable to state   Discharge Plan:  To be determined closer to discharge  Discussed with RN, Brittney Arnold  Needs within reach.        Muriel Umana [place], [facility]- [address]  Pg # [phone]  This document will serve as a discharge summary if pt discharge before next treatment   session

## 2018-09-25 NOTE — ED PROVIDER NOTES
[facility]          EM RESIDENT NOTE       Date of evaluation: 9/24/2018    Chief Complaint     Fall      History of Present Illness     Leighann Prabhakar is a 69 y.o. male who presents Found down    Patient history of mild dementia and alcohol abuse who was discovered down at his home earlier tonight. Patient normally speaks on the phone with friends daily but was less heard from on Friday evening. When friends noted that he was answering his phone calls tonight they called EMS to check on the patient. Upon arrival EMS found the patient sitting with his head against the wall next to the fireplace. There is no blood at the scene. Patient did have a sore on the back of his head. Patient had soiled himself with both urine and feces. Patient was moving all extremities but was confused and was brought to the emergency department for further evaluation. Fingerstick blood glucose within normal limits. Upon arrival patient is not able to provide further details due to his altered mental status, he denies pain in his chest abdomen pelvis although he appears to be a poor historian. Upon chart review it appears that patient had an echocardiogram in 2015 that showed an ejection fraction greater than 75% with normal left ventricular function. Review of Systems     Review of Systems    See HPI. A full 10-point review of systems was conducted and is otherwise negative unless noted above. Past Medical, Surgical, Family, and Social History     He has no past medical history on file. He has no past surgical history on file. His family history is not on file. He reports that he has never smoked. He has never used smokeless tobacco. He reports that he does not drink alcohol or use drugs. Medications     Previous Medications    No medications on file       Allergies     He has No Known Allergies.     Physical Exam     INITIAL VITALS: BP: (!) 146/77, Temp: 94.6 °F (34.8 °C), Hematocrit 45.0 40.5 - 52.5 %    .4 (H) 80.0 - 100.0 fL    MCH 35.6 (H) 26.0 - 34.0 pg    MCHC 31.9 31.0 - 36.0 g/dL    RDW 15.1 12.4 - 15.4 %    Platelets 328 460 - 883 K/uL    MPV 8.6 5.0 - 10.5 fL    Neutrophils % 91.8 %    Lymphocytes % 2.3 %    Monocytes % 5.2 %    Eosinophils % 0.0 %    Basophils % 0.7 %    Neutrophils # 19.1 (H) 1.7 - 7.7 K/uL    Lymphocytes # 0.5 (L) 1.0 - 5.1 K/uL    Monocytes # 1.1 0.0 - 1.3 K/uL    Eosinophils # 0.0 0.0 - 0.6 K/uL    Basophils # 0.1 0.0 - 0.2 K/uL   Hepatic Function Panel   Result Value Ref Range    Total Protein 5.6 (L) 6.4 - 8.2 g/dL    Alb 2.8 (L) 3.4 - 5.0 g/dL    Alkaline Phosphatase 121 40 - 129 U/L    ALT 22 10 - 40 U/L    AST 75 (H) 15 - 37 U/L    Total Bilirubin 0.6 0.0 - 1.0 mg/dL    Bilirubin, Direct <0.2 0.0 - 0.3 mg/dL    Bilirubin, Indirect see below 0.0 - 1.0 mg/dL   Lipase   Result Value Ref Range    Lipase 11.0 (L) 13.0 - 60.0 U/L   Protime-INR   Result Value Ref Range    Protime 10.6 9.8 - 13.0 sec    INR 0.93 0.86 - 1.14   CK (Lab)   Result Value Ref Range    Total CK 1,819 (H) 39 - 308 U/L   Ethanol   Result Value Ref Range    Ethanol Lvl None Detected mg/dL   Urine Drug Screen   Result Value Ref Range    Amphetamine Screen, Urine Neg Negative <1000ng/mL    Barbiturate Screen, Ur Neg Negative <200 ng/mL    Benzodiazepine Screen, Urine Neg Negative <200 ng/mL    Cannabinoid Scrn, Ur Neg Negative <50 ng/mL    Cocaine Metabolite Screen, Urine Neg Negative <300 ng/mL    Opiate Scrn, Ur Neg Negative <300 ng/mL    PCP Screen, Urine Neg Negative <25 ng/mL    Methadone Screen, Urine Neg Negative <300 ng/mL    Propoxyphene Scrn, Ur Neg Negative <300 ng/mL    pH, UA 5.5     Drug Screen Comment: see below     Oxycodone Urine Neg Negative <100 ng/ml   Acetaminophen level   Result Value Ref Range    Acetaminophen Level <5 (L) 10 - 30 ug/mL   Salicylate   Result Value Ref Range    Salicylate, Serum <7.8 (L) 15.0 - 30.0 mg/dL   Ammonia   Result Value Ref Range Result Value Ref Range    BNP 87.0 0.0 - 99.9 pg/mL    Sample Type GLORIA     Performed on SEE BELOW    POCT Venous   Result Value Ref Range    Lactate 1.51 0.40 - 2.00 mmol/L    Sample Type GLORIA     Performed on SEE BELOW    TYPE AND SCREEN   Result Value Ref Range    ABO/Rh CANCELED     Antibody Screen NEG    ABO/Rh   Result Value Ref Range    ABO/Rh O NEG        ED BEDSIDE ULTRASOUND:      RECENT VITALS:  BP: 105/64, Temp: 96.8 °F (36 °C), Pulse: 108, Resp: 17, SpO2: 97 %     Procedures         ED Course     Nursing Notes, Past Medical Hx, Past Surgical Hx, Social Hx, Allergies, and Family Hx were reviewed. The patient was given the following medications:  Orders Placed This Encounter   Medications    0.9 % sodium chloride bolus    sodium chloride flush 0.9 % injection 10 mL    sodium chloride flush 0.9 % injection 10 mL    piperacillin-tazobactam (ZOSYN) 3.375 g in dextrose 5 % 100 mL IVPB (mini-bag)    DISCONTD: vancomycin (VANCOCIN) 1,000 mg in dextrose 5 % 250 mL IVPB    vancomycin (VANCOCIN) 1,250 mg in dextrose 5 % 250 mL IVPB    magnesium sulfate 2 g in 50 mL IVPB premix    iopamidol (ISOVUE-370) 76 % injection 80 mL       CONSULTS:  [address] [place] [address] / ASSESSMENT / Patti Velez is a 69 y.o. male with a history and presentation as described above in HPI. The patient was evaluated by myself and the ED Attending Physician. All management and disposition plans were discussed and agreed upon. Patient presents with altered mental status after being found down. No signs of trauma at the scene per EMS report. Unknown total downtime. On initial evaluation he is tachycardic and hypothermic. CBC shows a leukocytosis. Concern for underlying infection with multiple positive SIRS, patient started on prospect of antibiotics as well as 30 mL/kg bolus of IV fluids. Head CT shows no acute concern.   Imaging of the chest and abdomen/pelvis pending at time of

## 2018-09-25 NOTE — ED NOTES
Bed: A03-03  Expected date:   Expected time:   Means of arrival:   Comments:  [facility], RN  09/24/18 6444

## 2018-09-26 LAB
ANION GAP SERPL CALCULATED.3IONS-SCNC: 10 MMOL/L (ref 3–16)
ANION GAP SERPL CALCULATED.3IONS-SCNC: 9 MMOL/L (ref 3–16)
BASOPHILS ABSOLUTE: 0 K/UL (ref 0–0.2)
BASOPHILS RELATIVE PERCENT: 0.5 %
BUN BLDV-MCNC: 20 MG/DL (ref 7–20)
BUN BLDV-MCNC: 22 MG/DL (ref 7–20)
CALCIUM IONIZED: 1.03 MMOL/L (ref 1.12–1.32)
CALCIUM SERPL-MCNC: 7.3 MG/DL (ref 8.3–10.6)
CALCIUM SERPL-MCNC: 7.6 MG/DL (ref 8.3–10.6)
CHLORIDE BLD-SCNC: 103 MMOL/L (ref 99–110)
CHLORIDE BLD-SCNC: 104 MMOL/L (ref 99–110)
CO2: 32 MMOL/L (ref 21–32)
CO2: 33 MMOL/L (ref 21–32)
CREAT SERPL-MCNC: 1.2 MG/DL (ref 0.8–1.3)
CREAT SERPL-MCNC: 1.3 MG/DL (ref 0.8–1.3)
EOSINOPHILS ABSOLUTE: 0 K/UL (ref 0–0.6)
EOSINOPHILS RELATIVE PERCENT: 0.1 %
GFR AFRICAN AMERICAN: >60
GFR AFRICAN AMERICAN: >60
GFR NON-AFRICAN AMERICAN: 54
GFR NON-AFRICAN AMERICAN: 59
GLUCOSE BLD-MCNC: 117 MG/DL (ref 70–99)
GLUCOSE BLD-MCNC: 120 MG/DL (ref 70–99)
GLUCOSE BLD-MCNC: 125 MG/DL (ref 70–99)
GLUCOSE BLD-MCNC: 127 MG/DL (ref 70–99)
GLUCOSE BLD-MCNC: 147 MG/DL (ref 70–99)
GLUCOSE BLD-MCNC: 157 MG/DL (ref 70–99)
GLUCOSE BLD-MCNC: 158 MG/DL (ref 70–99)
GLUCOSE BLD-MCNC: 166 MG/DL (ref 70–99)
HCT VFR BLD CALC: 33.3 % (ref 40.5–52.5)
HCT VFR BLD CALC: 35.3 % (ref 40.5–52.5)
HEMOGLOBIN: 11.5 G/DL (ref 13.5–17.5)
HEMOGLOBIN: 11.9 G/DL (ref 13.5–17.5)
LYMPHOCYTES ABSOLUTE: 0.5 K/UL (ref 1–5.1)
LYMPHOCYTES RELATIVE PERCENT: 5.9 %
MAGNESIUM: 2.1 MG/DL (ref 1.8–2.4)
MAGNESIUM: 2.5 MG/DL (ref 1.8–2.4)
MCH RBC QN AUTO: 36.1 PG (ref 26–34)
MCHC RBC AUTO-ENTMCNC: 34.5 G/DL (ref 31–36)
MCV RBC AUTO: 104.5 FL (ref 80–100)
MONOCYTES ABSOLUTE: 0.4 K/UL (ref 0–1.3)
MONOCYTES RELATIVE PERCENT: 4.9 %
NEUTROPHILS ABSOLUTE: 7.5 K/UL (ref 1.7–7.7)
NEUTROPHILS RELATIVE PERCENT: 88.6 %
PDW BLD-RTO: 14.3 % (ref 12.4–15.4)
PERFORMED ON: ABNORMAL
PH VENOUS: 7.45 (ref 7.35–7.45)
PHOSPHORUS: 2.2 MG/DL (ref 2.5–4.9)
PHOSPHORUS: 2.4 MG/DL (ref 2.5–4.9)
PLATELET # BLD: 203 K/UL (ref 135–450)
PMV BLD AUTO: 7.9 FL (ref 5–10.5)
POTASSIUM REFLEX MAGNESIUM: 2.8 MMOL/L (ref 3.5–5.1)
POTASSIUM REFLEX MAGNESIUM: 2.8 MMOL/L (ref 3.5–5.1)
POTASSIUM SERPL-SCNC: 3.1 MMOL/L (ref 3.5–5.1)
RBC # BLD: 3.19 M/UL (ref 4.2–5.9)
SODIUM BLD-SCNC: 144 MMOL/L (ref 136–145)
SODIUM BLD-SCNC: 147 MMOL/L (ref 136–145)
TOTAL CK: 687 U/L (ref 39–308)
URINE CULTURE, ROUTINE: NORMAL
WBC # BLD: 8.5 K/UL (ref 4–11)

## 2018-09-26 PROCEDURE — 85025 COMPLETE CBC W/AUTO DIFF WBC: CPT

## 2018-09-26 PROCEDURE — 6360000002 HC RX W HCPCS: Performed by: STUDENT IN AN ORGANIZED HEALTH CARE EDUCATION/TRAINING PROGRAM

## 2018-09-26 PROCEDURE — 83735 ASSAY OF MAGNESIUM: CPT

## 2018-09-26 PROCEDURE — 2060000000 HC ICU INTERMEDIATE R&B

## 2018-09-26 PROCEDURE — 2580000003 HC RX 258: Performed by: STUDENT IN AN ORGANIZED HEALTH CARE EDUCATION/TRAINING PROGRAM

## 2018-09-26 PROCEDURE — 87449 NOS EACH ORGANISM AG IA: CPT

## 2018-09-26 PROCEDURE — 2580000003 HC RX 258: Performed by: INTERNAL MEDICINE

## 2018-09-26 PROCEDURE — 82550 ASSAY OF CK (CPK): CPT

## 2018-09-26 PROCEDURE — 87324 CLOSTRIDIUM AG IA: CPT

## 2018-09-26 PROCEDURE — 84132 ASSAY OF SERUM POTASSIUM: CPT

## 2018-09-26 PROCEDURE — 84100 ASSAY OF PHOSPHORUS: CPT

## 2018-09-26 PROCEDURE — 6360000002 HC RX W HCPCS: Performed by: FAMILY MEDICINE

## 2018-09-26 PROCEDURE — 2500000003 HC RX 250 WO HCPCS: Performed by: STUDENT IN AN ORGANIZED HEALTH CARE EDUCATION/TRAINING PROGRAM

## 2018-09-26 PROCEDURE — 85014 HEMATOCRIT: CPT

## 2018-09-26 PROCEDURE — 36415 COLL VENOUS BLD VENIPUNCTURE: CPT

## 2018-09-26 PROCEDURE — 6360000002 HC RX W HCPCS: Performed by: INTERNAL MEDICINE

## 2018-09-26 PROCEDURE — 92526 ORAL FUNCTION THERAPY: CPT

## 2018-09-26 PROCEDURE — 85018 HEMOGLOBIN: CPT

## 2018-09-26 PROCEDURE — 2580000003 HC RX 258: Performed by: FAMILY MEDICINE

## 2018-09-26 PROCEDURE — 80048 BASIC METABOLIC PNL TOTAL CA: CPT

## 2018-09-26 PROCEDURE — 6370000000 HC RX 637 (ALT 250 FOR IP): Performed by: INTERNAL MEDICINE

## 2018-09-26 RX ORDER — NICOTINE 21 MG/24HR
1 PATCH, TRANSDERMAL 24 HOURS TRANSDERMAL DAILY
Status: DISCONTINUED | OUTPATIENT
Start: 2018-09-27 | End: 2018-10-03 | Stop reason: HOSPADM

## 2018-09-26 RX ORDER — POTASSIUM CHLORIDE 7.45 MG/ML
10 INJECTION INTRAVENOUS
Status: DISPENSED | OUTPATIENT
Start: 2018-09-26 | End: 2018-09-26

## 2018-09-26 RX ORDER — POTASSIUM CHLORIDE 7.45 MG/ML
10 INJECTION INTRAVENOUS ONCE
Status: COMPLETED | OUTPATIENT
Start: 2018-09-26 | End: 2018-09-26

## 2018-09-26 RX ORDER — LEVOTHYROXINE SODIUM ANHYDROUS 100 UG/5ML
50 INJECTION, POWDER, LYOPHILIZED, FOR SOLUTION INTRAVENOUS DAILY
Status: DISCONTINUED | OUTPATIENT
Start: 2018-09-26 | End: 2018-10-01

## 2018-09-26 RX ORDER — SODIUM CHLORIDE 450 MG/100ML
INJECTION, SOLUTION INTRAVENOUS CONTINUOUS
Status: DISCONTINUED | OUTPATIENT
Start: 2018-09-26 | End: 2018-09-26

## 2018-09-26 RX ORDER — SODIUM CHLORIDE 9 MG/ML
INJECTION, SOLUTION INTRAVENOUS CONTINUOUS
Status: DISCONTINUED | OUTPATIENT
Start: 2018-09-26 | End: 2018-09-26

## 2018-09-26 RX ORDER — POTASSIUM CHLORIDE 7.45 MG/ML
10 INJECTION INTRAVENOUS
Status: COMPLETED | OUTPATIENT
Start: 2018-09-26 | End: 2018-09-26

## 2018-09-26 RX ORDER — NICOTINE 21 MG/24HR
1 PATCH, TRANSDERMAL 24 HOURS TRANSDERMAL DAILY
Status: DISCONTINUED | OUTPATIENT
Start: 2018-09-26 | End: 2018-09-26

## 2018-09-26 RX ORDER — DEXTROSE, SODIUM CHLORIDE, AND POTASSIUM CHLORIDE 5; .45; .3 G/100ML; G/100ML; G/100ML
INJECTION INTRAVENOUS CONTINUOUS
Status: DISCONTINUED | OUTPATIENT
Start: 2018-09-26 | End: 2018-09-28

## 2018-09-26 RX ORDER — 0.9 % SODIUM CHLORIDE 0.9 %
5 VIAL (ML) INJECTION DAILY
Status: DISCONTINUED | OUTPATIENT
Start: 2018-09-26 | End: 2018-10-01

## 2018-09-26 RX ADMIN — LORAZEPAM 1 MG: 2 INJECTION INTRAMUSCULAR; INTRAVENOUS at 13:07

## 2018-09-26 RX ADMIN — POTASSIUM CHLORIDE 10 MEQ: 10 INJECTION, SOLUTION INTRAVENOUS at 10:10

## 2018-09-26 RX ADMIN — CALCIUM GLUCONATE 2 G: 98 INJECTION, SOLUTION INTRAVENOUS at 01:10

## 2018-09-26 RX ADMIN — SODIUM CHLORIDE: 9 INJECTION, SOLUTION INTRAVENOUS at 10:10

## 2018-09-26 RX ADMIN — PIPERACILLIN SODIUM AND TAZOBACTAM SODIUM 3.38 G: 3; .375 INJECTION, POWDER, LYOPHILIZED, FOR SOLUTION INTRAVENOUS at 20:35

## 2018-09-26 RX ADMIN — POTASSIUM CHLORIDE 10 MEQ: 7.46 INJECTION, SOLUTION INTRAVENOUS at 02:19

## 2018-09-26 RX ADMIN — POTASSIUM CHLORIDE 10 MEQ: 7.46 INJECTION, SOLUTION INTRAVENOUS at 07:11

## 2018-09-26 RX ADMIN — Medication 5 ML: at 16:55

## 2018-09-26 RX ADMIN — PIPERACILLIN SODIUM AND TAZOBACTAM SODIUM 3.38 G: 3; .375 INJECTION, POWDER, LYOPHILIZED, FOR SOLUTION INTRAVENOUS at 05:44

## 2018-09-26 RX ADMIN — POTASSIUM CHLORIDE 10 MEQ: 10 INJECTION, SOLUTION INTRAVENOUS at 12:19

## 2018-09-26 RX ADMIN — HEPARIN SODIUM 5000 UNITS: 5000 INJECTION INTRAVENOUS; SUBCUTANEOUS at 14:52

## 2018-09-26 RX ADMIN — POTASSIUM CHLORIDE: 2 INJECTION, SOLUTION, CONCENTRATE INTRAVENOUS at 11:46

## 2018-09-26 RX ADMIN — PIPERACILLIN SODIUM AND TAZOBACTAM SODIUM 3.38 G: 3; .375 INJECTION, POWDER, LYOPHILIZED, FOR SOLUTION INTRAVENOUS at 15:08

## 2018-09-26 RX ADMIN — THIAMINE HYDROCHLORIDE 100 MG: 100 INJECTION, SOLUTION INTRAMUSCULAR; INTRAVENOUS at 07:10

## 2018-09-26 RX ADMIN — POTASSIUM CHLORIDE 10 MEQ: 10 INJECTION, SOLUTION INTRAVENOUS at 13:40

## 2018-09-26 RX ADMIN — Medication 10 ML: at 20:37

## 2018-09-26 RX ADMIN — HEPARIN SODIUM 5000 UNITS: 5000 INJECTION INTRAVENOUS; SUBCUTANEOUS at 22:18

## 2018-09-26 RX ADMIN — HEPARIN SODIUM 5000 UNITS: 5000 INJECTION INTRAVENOUS; SUBCUTANEOUS at 05:44

## 2018-09-26 RX ADMIN — INSULIN LISPRO 1 UNITS: 100 INJECTION, SOLUTION INTRAVENOUS; SUBCUTANEOUS at 06:19

## 2018-09-26 RX ADMIN — VANCOMYCIN HYDROCHLORIDE 1000 MG: 10 INJECTION, POWDER, LYOPHILIZED, FOR SOLUTION INTRAVENOUS at 01:59

## 2018-09-26 RX ADMIN — LEVOTHYROXINE SODIUM ANHYDROUS 50 MCG: 100 INJECTION, POWDER, LYOPHILIZED, FOR SOLUTION INTRAVENOUS at 16:55

## 2018-09-26 RX ADMIN — INSULIN LISPRO 1 UNITS: 100 INJECTION, SOLUTION INTRAVENOUS; SUBCUTANEOUS at 02:05

## 2018-09-26 RX ADMIN — POTASSIUM CHLORIDE, DEXTROSE MONOHYDRATE AND SODIUM CHLORIDE: 300; 5; 450 INJECTION, SOLUTION INTRAVENOUS at 16:53

## 2018-09-26 RX ADMIN — Medication 10 ML: at 20:36

## 2018-09-26 RX ADMIN — INSULIN LISPRO 1 UNITS: 100 INJECTION, SOLUTION INTRAVENOUS; SUBCUTANEOUS at 09:34

## 2018-09-26 RX ADMIN — LORAZEPAM 2 MG: 2 INJECTION INTRAMUSCULAR; INTRAVENOUS at 22:57

## 2018-09-26 ASSESSMENT — PAIN SCALES - GENERAL
PAINLEVEL_OUTOF10: 0

## 2018-09-26 NOTE — PROGRESS NOTES
Internal Medicine PGY-2 Resident Progress Note        PCP: Abdirahman Prakash    Date of Admission: 9/24/2018    Chief Complaint: Altered mental status     Subjective:     No acute events overnight. Patient was seen and examined. Sitter at bedside. Patient remains altered, not answering questions appropriately. Medications:  Reviewed    Infusion Medications    IV infusion builder      dextrose       Scheduled Medications    nicotine  1 patch Transdermal Once    [START ON 9/27/2018] nicotine  1 patch Transdermal Daily    levothyroxine  50 mcg Intravenous Daily    And    sodium chloride (PF)  5 mL Intravenous Daily    sodium chloride flush  10 mL Intravenous 2 times per day    piperacillin-tazobactam  3.375 g Intravenous Q8H    thiamine (VITAMIN B1) IVPB  100 mg Intravenous Q24H    sodium chloride flush  10 mL Intravenous 2 times per day    vancomycin  1,000 mg Intravenous Q24H    insulin lispro  0-6 Units Subcutaneous Q4H    heparin (porcine)  5,000 Units Subcutaneous 3 times per day     PRN Meds: sodium chloride flush, magnesium hydroxide, ondansetron, sodium chloride flush, LORazepam **OR** LORazepam **OR** LORazepam **OR** LORazepam **OR** LORazepam **OR** LORazepam **OR** LORazepam **OR** LORazepam, glucose, dextrose, glucagon (rDNA), dextrose, metoprolol      Intake/Output Summary (Last 24 hours) at 09/26/18 1504  Last data filed at 09/26/18 1210   Gross per 24 hour   Intake             2624 ml   Output              700 ml   Net             1924 ml       Physical Exam Performed:    /88   Pulse 104   Temp 97.1 °F (36.2 °C) (Axillary)   Resp 18   Ht 5' 8\" (1.727 m)   Wt 133 lb 6.1 oz (60.5 kg)   SpO2 99%   BMI 20.28 kg/m²     General appearance: Chronically ill appearing male. HEENT:  Right posteriolateral 3cm x 3cm area of edema and bruising on scalp correlating to CT finding. Pupils equal, round, and reactive to light. Extra ocular muscles intact. Scleral xanthomas bilaterally.

## 2018-09-26 NOTE — PROGRESS NOTES
Speech Language Pathology  Facility/Department: [place] [address] PCU  Dysphagia Daily Treatment Note    NAME: Mer Colindres  : 1940  MRN: [id number]    Patient Diagnosis(es):   Patient Active Problem List    Diagnosis Date Noted    Sepsis ([facility] [address] 2018     Allergies: No Known Allergies    Recent Chest Xray 18  No acute pulmonary disease.     CT of Chest 18  Small right pleural effusion.  Bilateral upper lobe nodules require    follow-up.       CT head 18      1. No acute intracranial process. Low-density fluid collection in the scalp may represent a chronic hematoma versus cellulitis and edema.       2. Mild cerebral atrophy.       3. Mild chronic small vessel ischemic disease.             Previous MBS - none    Chart reviewed. Medical Diagnosis: sepsis  Treatment Diagnosis: dysphagia    BSE Impression 18  Pt with history of ETOH- found down at home. Currently pt is very confused, unable to follow commands or respond to questions appropriately. Pt coughing up thick white phlegm which needed to be removed with toothette. Pt with head positioned back and tilted to the right- unable to place head at midline when attempted to adjust pillow and unable to follow commands to place head at midline. Oral- unable to assess ROM and strength due to inability to follow commands, but no facial droop observed. Pt required cues to close lips around the spoon to take ice chip. Once ice chip was in his mouth, pt required cues for mastication. Pt required cues for appropriate use of a straw- initially bit down on straw, but was eventually able to draw a small amount of water up the straw. Once the water was in his mouth, pt proceeded to swish it around and eventually swallow. Pharyngeal- pt with delayed swallow initiation with ice chip then noted throat clearing. With sip of water, pt produced wet, audible, multiple swallows followed by coughing.  Pt with neck hyperextended which likely decreased airway protection. Did not present pt with further PO trials due to concern for Pt safety. Pt is not safe for po intake     MBS results -n/a at this time    Pain: pt stating IV in left arm hurting, RN notified    Current Diet : NPO    Treatment:  Pt seen bedside to address the following goals:  1-The patient will tolerate repeat BSE when able. 9/26: pt alert, did not follow commands, stated it was August.  Pt with improvement in ability to manage trials orally and is not expectorating secretions. Pt also able to keep head midline today, vs in extension during initial eval.  Pt presented with ice chips, tsp/sips of water and nectar thick water as well as applesauce. Pt with weak cough after water, nectar thick water. Pt noted to exhibit extra reflexive dry swallows after applesauce which may be indicative of pharyngeal residue. As pt demonstrating probable overt signs of aspiration with all consistencies and still remains confused, recommend cont NPO, with aggressive oral care and very small amounts of ice chips throughout the day for comfort. Will re-assess next session to determine ability to take PO vs possible need for instrumental exam.  Cont goal    Patient/Family/Caregiver Education:  Pt educated to purpose of visit, does not demonstrate full comprehension    Compensatory Strategies:  n/a     Plan:  Continued daily Dysphagia treatment with goals per  plan of care. Diet recommendations: NPO  Aggressive oral care - small amounts of ice chips throughout the day per RN only, for comfort (pt has been asking for water)  DC recommendation: TBD closer to dc  Treatment: 15  D/W nursing Katie  Needs met prior to leaving room, call button in reach. Roosevelt Contreras M.S./[facility]-SLP #[id number]  Pg.  # [phone]    If patient is discharged prior to next treatment, this note will serve as the discharge summary

## 2018-09-26 NOTE — PROGRESS NOTES
Occupational Therapy/Physical Therapy-HOLD    OT/PT orders received, chart reviewed, noted pt's potassium=2.8. Per OT/PT guidelines, pt is placed on hold secondary to low potassium level. Will follow up later this PM as schedule permits. Above reported to Brianda Perkins, OTR/LIANNA Arias 19  Gladis Brown PT, DPT, [id number]

## 2018-09-27 ENCOUNTER — APPOINTMENT (OUTPATIENT)
Dept: GENERAL RADIOLOGY | Age: 78
DRG: 871 | End: 2018-09-27
Payer: MEDICARE

## 2018-09-27 LAB
ANION GAP SERPL CALCULATED.3IONS-SCNC: 10 MMOL/L (ref 3–16)
BASOPHILS ABSOLUTE: 0 K/UL (ref 0–0.2)
BASOPHILS RELATIVE PERCENT: 0.3 %
BUN BLDV-MCNC: 13 MG/DL (ref 7–20)
C DIFFICILE TOXIN, EIA: NORMAL
CALCIUM SERPL-MCNC: 7.2 MG/DL (ref 8.3–10.6)
CHLORIDE BLD-SCNC: 102 MMOL/L (ref 99–110)
CO2: 27 MMOL/L (ref 21–32)
CREAT SERPL-MCNC: 0.8 MG/DL (ref 0.8–1.3)
EOSINOPHILS ABSOLUTE: 0 K/UL (ref 0–0.6)
EOSINOPHILS RELATIVE PERCENT: 0.5 %
GFR AFRICAN AMERICAN: >60
GFR NON-AFRICAN AMERICAN: >60
GLUCOSE BLD-MCNC: 111 MG/DL (ref 70–99)
GLUCOSE BLD-MCNC: 122 MG/DL (ref 70–99)
GLUCOSE BLD-MCNC: 123 MG/DL (ref 70–99)
GLUCOSE BLD-MCNC: 138 MG/DL (ref 70–99)
GLUCOSE BLD-MCNC: 142 MG/DL (ref 70–99)
GLUCOSE BLD-MCNC: 143 MG/DL (ref 70–99)
GLUCOSE BLD-MCNC: 174 MG/DL (ref 70–99)
HCT VFR BLD CALC: 35.4 % (ref 40.5–52.5)
HCT VFR BLD CALC: 35.7 % (ref 40.5–52.5)
HEMOGLOBIN: 12.2 G/DL (ref 13.5–17.5)
HEMOGLOBIN: 12.3 G/DL (ref 13.5–17.5)
LYMPHOCYTES ABSOLUTE: 0.5 K/UL (ref 1–5.1)
LYMPHOCYTES RELATIVE PERCENT: 9 %
MCH RBC QN AUTO: 36.9 PG (ref 26–34)
MCHC RBC AUTO-ENTMCNC: 34.5 G/DL (ref 31–36)
MCV RBC AUTO: 107.1 FL (ref 80–100)
MONOCYTES ABSOLUTE: 0.3 K/UL (ref 0–1.3)
MONOCYTES RELATIVE PERCENT: 5.1 %
NEUTROPHILS ABSOLUTE: 5.1 K/UL (ref 1.7–7.7)
NEUTROPHILS RELATIVE PERCENT: 85.1 %
OCCULT BLOOD DIAGNOSTIC: ABNORMAL
PDW BLD-RTO: 14.6 % (ref 12.4–15.4)
PERFORMED ON: ABNORMAL
PHOSPHORUS: 1.9 MG/DL (ref 2.5–4.9)
PLATELET # BLD: 178 K/UL (ref 135–450)
PMV BLD AUTO: 8.1 FL (ref 5–10.5)
POTASSIUM SERPL-SCNC: 3.6 MMOL/L (ref 3.5–5.1)
RBC # BLD: 3.34 M/UL (ref 4.2–5.9)
SODIUM BLD-SCNC: 139 MMOL/L (ref 136–145)
VANCOMYCIN TROUGH: 15.1 UG/ML (ref 10–20)
WBC # BLD: 6 K/UL (ref 4–11)

## 2018-09-27 PROCEDURE — 99221 1ST HOSP IP/OBS SF/LOW 40: CPT | Performed by: NURSE PRACTITIONER

## 2018-09-27 PROCEDURE — 6370000000 HC RX 637 (ALT 250 FOR IP): Performed by: INTERNAL MEDICINE

## 2018-09-27 PROCEDURE — 84100 ASSAY OF PHOSPHORUS: CPT

## 2018-09-27 PROCEDURE — 71046 X-RAY EXAM CHEST 2 VIEWS: CPT

## 2018-09-27 PROCEDURE — 2060000000 HC ICU INTERMEDIATE R&B

## 2018-09-27 PROCEDURE — 2500000003 HC RX 250 WO HCPCS: Performed by: STUDENT IN AN ORGANIZED HEALTH CARE EDUCATION/TRAINING PROGRAM

## 2018-09-27 PROCEDURE — 6360000002 HC RX W HCPCS: Performed by: STUDENT IN AN ORGANIZED HEALTH CARE EDUCATION/TRAINING PROGRAM

## 2018-09-27 PROCEDURE — 2580000003 HC RX 258: Performed by: STUDENT IN AN ORGANIZED HEALTH CARE EDUCATION/TRAINING PROGRAM

## 2018-09-27 PROCEDURE — G0328 FECAL BLOOD SCRN IMMUNOASSAY: HCPCS

## 2018-09-27 PROCEDURE — 85025 COMPLETE CBC W/AUTO DIFF WBC: CPT

## 2018-09-27 PROCEDURE — 99223 1ST HOSP IP/OBS HIGH 75: CPT | Performed by: INTERNAL MEDICINE

## 2018-09-27 PROCEDURE — 80202 ASSAY OF VANCOMYCIN: CPT

## 2018-09-27 PROCEDURE — C9113 INJ PANTOPRAZOLE SODIUM, VIA: HCPCS | Performed by: STUDENT IN AN ORGANIZED HEALTH CARE EDUCATION/TRAINING PROGRAM

## 2018-09-27 PROCEDURE — 2580000003 HC RX 258: Performed by: FAMILY MEDICINE

## 2018-09-27 PROCEDURE — 36415 COLL VENOUS BLD VENIPUNCTURE: CPT

## 2018-09-27 PROCEDURE — 85014 HEMATOCRIT: CPT

## 2018-09-27 PROCEDURE — 80048 BASIC METABOLIC PNL TOTAL CA: CPT

## 2018-09-27 PROCEDURE — 85018 HEMOGLOBIN: CPT

## 2018-09-27 RX ORDER — PANTOPRAZOLE SODIUM 40 MG/10ML
40 INJECTION, POWDER, LYOPHILIZED, FOR SOLUTION INTRAVENOUS DAILY
Status: DISCONTINUED | OUTPATIENT
Start: 2018-09-27 | End: 2018-10-03 | Stop reason: HOSPADM

## 2018-09-27 RX ADMIN — PIPERACILLIN SODIUM AND TAZOBACTAM SODIUM 3.38 G: 3; .375 INJECTION, POWDER, LYOPHILIZED, FOR SOLUTION INTRAVENOUS at 21:31

## 2018-09-27 RX ADMIN — POTASSIUM CHLORIDE, DEXTROSE MONOHYDRATE AND SODIUM CHLORIDE: 300; 5; 450 INJECTION, SOLUTION INTRAVENOUS at 14:38

## 2018-09-27 RX ADMIN — THIAMINE HYDROCHLORIDE 100 MG: 100 INJECTION, SOLUTION INTRAMUSCULAR; INTRAVENOUS at 06:07

## 2018-09-27 RX ADMIN — HEPARIN SODIUM 5000 UNITS: 5000 INJECTION INTRAVENOUS; SUBCUTANEOUS at 15:05

## 2018-09-27 RX ADMIN — PIPERACILLIN SODIUM AND TAZOBACTAM SODIUM 3.38 G: 3; .375 INJECTION, POWDER, LYOPHILIZED, FOR SOLUTION INTRAVENOUS at 06:00

## 2018-09-27 RX ADMIN — HEPARIN SODIUM 5000 UNITS: 5000 INJECTION INTRAVENOUS; SUBCUTANEOUS at 21:40

## 2018-09-27 RX ADMIN — PIPERACILLIN SODIUM AND TAZOBACTAM SODIUM 3.38 G: 3; .375 INJECTION, POWDER, LYOPHILIZED, FOR SOLUTION INTRAVENOUS at 12:40

## 2018-09-27 RX ADMIN — INSULIN LISPRO 1 UNITS: 100 INJECTION, SOLUTION INTRAVENOUS; SUBCUTANEOUS at 06:25

## 2018-09-27 RX ADMIN — INSULIN LISPRO 1 UNITS: 100 INJECTION, SOLUTION INTRAVENOUS; SUBCUTANEOUS at 15:04

## 2018-09-27 RX ADMIN — POTASSIUM CHLORIDE, DEXTROSE MONOHYDRATE AND SODIUM CHLORIDE: 300; 5; 450 INJECTION, SOLUTION INTRAVENOUS at 04:44

## 2018-09-27 RX ADMIN — Medication 5 ML: at 15:15

## 2018-09-27 RX ADMIN — PANTOPRAZOLE SODIUM 40 MG: 40 INJECTION, POWDER, FOR SOLUTION INTRAVENOUS at 11:27

## 2018-09-27 RX ADMIN — POTASSIUM PHOSPHATE, MONOBASIC AND POTASSIUM PHOSPHATE, DIBASIC 15 MMOL: 224; 236 INJECTION, SOLUTION, CONCENTRATE INTRAVENOUS at 08:06

## 2018-09-27 RX ADMIN — VANCOMYCIN HYDROCHLORIDE 1000 MG: 10 INJECTION, POWDER, LYOPHILIZED, FOR SOLUTION INTRAVENOUS at 00:45

## 2018-09-27 RX ADMIN — LEVOTHYROXINE SODIUM ANHYDROUS 50 MCG: 100 INJECTION, POWDER, LYOPHILIZED, FOR SOLUTION INTRAVENOUS at 15:15

## 2018-09-27 RX ADMIN — Medication 10 ML: at 08:11

## 2018-09-27 RX ADMIN — POTASSIUM PHOSPHATE, MONOBASIC AND POTASSIUM PHOSPHATE, DIBASIC 15 MMOL: 224; 236 INJECTION, SOLUTION, CONCENTRATE INTRAVENOUS at 12:40

## 2018-09-27 RX ADMIN — HEPARIN SODIUM 5000 UNITS: 5000 INJECTION INTRAVENOUS; SUBCUTANEOUS at 06:07

## 2018-09-27 RX ADMIN — INSULIN LISPRO 1 UNITS: 100 INJECTION, SOLUTION INTRAVENOUS; SUBCUTANEOUS at 02:59

## 2018-09-27 ASSESSMENT — PAIN SCALES - GENERAL
PAINLEVEL_OUTOF10: 0
PAINLEVEL_OUTOF10: 0

## 2018-09-27 NOTE — PLAN OF CARE
Problem: Confusion - Acute:  Goal: Mental status will be restored to baseline  Mental status will be restored to baseline    Outcome: Ongoing  Patient is still not oriented to self, situation, time, or place. Patient has been calm for most of this shift, but at one point scored high on CIWA and ativan was given, as patient was anxious, disoriented, and continuously fidgeting and attempting to pull on his wires, chaudhari, and IVs. Patient has been otherwise mostly calm and only attempting to pull on things at times. Problem: Skin Integrity:  Goal: Absence of new skin breakdown  Absence of new skin breakdown   Outcome: Ongoing  Patient has been turned every two hours and repositioned as needed. Pillow support has been utilized to keep patient comfortable and to support bony prominences. Patient has had may care and chaudhari care as needed. Will continue to monitor.

## 2018-09-27 NOTE — CONSULTS
[facility]  Palliative Medicine Consultation Note      Date Of Admission:9/24/2018  Date of consult: 09/27/18  Seen by [facility] in the past:  No    Recommendations:        PC consult received, unfortunately patient is unable to participate in conversation about goals of care and he currently doesn't have a legal decision maker. Please refer to Dr. Kaylyn Villavicencio note, as well as, Emily Wilcox, RN/CM note regarding placement and involvement of APS. If a guardian is established while patient is at hospital, PC will discuss goals of care for this patient and hopefully get the appropriate services involved outpatient. 1. Goals of Care/Advanced Care planning/Code status: Full code  2. Pain: patient doesn't appear in acute pain at this time  3. SOB: patient doesn't appear in acute RR distress at this time, he appears comfortable. 4. Sepsis: patient continues on vanc and Zosyn. 5. Disposition: placement needed. 6. AMS: currently unresponsive, had been slightly agitated and had received Ativan       Reason for Consult:         __x___ Goals of Care  __x___ Code Status Discussion/Advanced Care Planning   __x___ Psychosocial/Family Support  __x___ Symptom Management: (Specify Symptom)    _____ Other Desiree White)    Requesting Physician:  Dr. Jared Lara:  Found down     History Obtained From:  electronic medical record      History of Present Illness:         Patient is a 69year old male with a Pmhx for HTN, gout, and ETOH abuse who presented to the ED from home via EMS after being found down at home for an unknown period of time. In the ED, he was hypothermic, lactate was 2.21, WBC of 20.8 and UA negative. He was admitted for further evaluation . Subjective:                     Past Medical History:    History reviewed. No pertinent past medical history. Past Surgical History:    History reviewed. No pertinent surgical history.     Current Medications:    Current Facility-Administered

## 2018-09-27 NOTE — PROGRESS NOTES
Internal Medicine PGY-1 Resident Progress Note        PCP: Ellie Webber    Date of Admission: 9/24/2018    Chief Complaint: Altered mental status     Subjective: Damian Epp. Met pt at bedside. Sitter at bedside. Patient remains altered, but opens eyes when asked; otherwise does not respond to ROS. Yesterday: loose black watery diarrhea reported by nurse    No restlessness, sweating, fever, tachycardia. Transient slight shaking of upper extremities. Phosphate 1.9  CT chest:  Small right pleural effusion. Bilateral upper lobe nodules.     Medications:  Reviewed    Infusion Medications    dextrose 5% and 0.45% NaCl with KCl 40 mEq 100 mL/hr at 09/27/18 0444    dextrose       Scheduled Medications    potassium phosphate IVPB  15 mmol Intravenous Once    potassium phosphate IVPB  15 mmol Intravenous Once    nicotine  1 patch Transdermal Once    nicotine  1 patch Transdermal Daily    levothyroxine  50 mcg Intravenous Daily    And    sodium chloride (PF)  5 mL Intravenous Daily    sodium chloride flush  10 mL Intravenous 2 times per day    piperacillin-tazobactam  3.375 g Intravenous Q8H    thiamine (VITAMIN B1) IVPB  100 mg Intravenous Q24H    sodium chloride flush  10 mL Intravenous 2 times per day    vancomycin  1,000 mg Intravenous Q24H    insulin lispro  0-6 Units Subcutaneous Q4H    heparin (porcine)  5,000 Units Subcutaneous 3 times per day     PRN Meds: sodium chloride flush, magnesium hydroxide, ondansetron, sodium chloride flush, LORazepam **OR** LORazepam **OR** LORazepam **OR** LORazepam **OR** LORazepam **OR** LORazepam **OR** LORazepam **OR** LORazepam, glucose, dextrose, glucagon (rDNA), dextrose, metoprolol      Intake/Output Summary (Last 24 hours) at 09/27/18 0923  Last data filed at 09/27/18 1072   Gross per 24 hour   Intake             2513 ml   Output             1050 ml   Net             1463 ml       Physical Exam Performed:    BP (!) 142/95   Pulse 98   Temp 97 °F (36.1 °C)

## 2018-09-27 NOTE — PLAN OF CARE
Problem: Falls - Risk of: Intervention: Assess potential safety hazards  Pt free from injury or falls at this time, fall precautions in place, bed in low position, side rail up x2, Mehta Fall Risk: High (45 and higher), bed alarm on, reoriented to room and call light, reminded not to get up without assistance, call light in reach, will continue to monitor. Pt verbalizes understanding of fall risk procedures.

## 2018-09-27 NOTE — PROGRESS NOTES
Physical Therapy/Occupational Therapy  HOLD  Discussed with RN, patient not appropriate for therapy at this time. Very lethargic and not alert enough to participate in therapy. Will follow up as appropriate and schedule permits. Of note, this is the third attempt to work with pt since admission.       Galina Thorpe PT, DPT, [id number]  Erin Medina OTR/L #[id number]

## 2018-09-27 NOTE — BH NOTE
Palliative Care Team Consult  Psychologist    Patient: Pinky Panda   Date:  9/27/2018    Reason for Consult: Pt is referred for evaluation of decisional capacity and potential need for guardianship. Hx of Presenting Problem: Pt found down by neighbor, unclear how long he had been down. He is being treated for sepsis, effects of chronic alcoholism, mild Rhabdo, metabolic acidosis     Social Hx: Pt lives alone, was found down and unresponsive by neighbor who called EMS. He is active alcoholic. Mental Status: The pt is a 69year old man who was not alert or responsive. The lab was in to get blood and the pt didn't even react to being stuck. RN reports that other than occasional agitation this has been pt's baseline since admission. Impression: The pt is clearly not able to make his decisions for medical care at this time. His mental status will need to be reassessed on a regular basis, especially if he becomes more alert. At this point since decisions need to be made regarding his care I will fill out a statement of expert eval for guardianship so that he can have a surrogate advocating for his needs and making decisions regarding his disposition.       Miranda Basilio Psy.D., ABPP

## 2018-09-28 PROBLEM — J90 PLEURAL EFFUSION: Status: ACTIVE | Noted: 2018-09-28

## 2018-09-28 LAB
ANION GAP SERPL CALCULATED.3IONS-SCNC: 9 MMOL/L (ref 3–16)
BASOPHILS ABSOLUTE: 0 K/UL (ref 0–0.2)
BASOPHILS RELATIVE PERCENT: 0.2 %
BUN BLDV-MCNC: 8 MG/DL (ref 7–20)
CALCIUM SERPL-MCNC: 7.3 MG/DL (ref 8.3–10.6)
CHLORIDE BLD-SCNC: 105 MMOL/L (ref 99–110)
CO2: 26 MMOL/L (ref 21–32)
CREAT SERPL-MCNC: 0.7 MG/DL (ref 0.8–1.3)
EOSINOPHILS ABSOLUTE: 0 K/UL (ref 0–0.6)
EOSINOPHILS RELATIVE PERCENT: 0.9 %
GFR AFRICAN AMERICAN: >60
GFR NON-AFRICAN AMERICAN: >60
GLUCOSE BLD-MCNC: 102 MG/DL (ref 70–99)
GLUCOSE BLD-MCNC: 105 MG/DL (ref 70–99)
GLUCOSE BLD-MCNC: 109 MG/DL (ref 70–99)
GLUCOSE BLD-MCNC: 118 MG/DL (ref 70–99)
GLUCOSE BLD-MCNC: 119 MG/DL (ref 70–99)
GLUCOSE BLD-MCNC: 83 MG/DL (ref 70–99)
HCT VFR BLD CALC: 36.8 % (ref 40.5–52.5)
HEMOGLOBIN: 12.3 G/DL (ref 13.5–17.5)
INR BLD: 0.9 (ref 0.86–1.14)
LYMPHOCYTES ABSOLUTE: 0.5 K/UL (ref 1–5.1)
LYMPHOCYTES RELATIVE PERCENT: 8.4 %
MCH RBC QN AUTO: 36 PG (ref 26–34)
MCHC RBC AUTO-ENTMCNC: 33.6 G/DL (ref 31–36)
MCV RBC AUTO: 107.3 FL (ref 80–100)
MONOCYTES ABSOLUTE: 0.4 K/UL (ref 0–1.3)
MONOCYTES RELATIVE PERCENT: 6.6 %
NEUTROPHILS ABSOLUTE: 4.8 K/UL (ref 1.7–7.7)
NEUTROPHILS RELATIVE PERCENT: 83.9 %
PDW BLD-RTO: 14.1 % (ref 12.4–15.4)
PERFORMED ON: ABNORMAL
PERFORMED ON: NORMAL
PLATELET # BLD: 176 K/UL (ref 135–450)
PMV BLD AUTO: 8 FL (ref 5–10.5)
POTASSIUM SERPL-SCNC: 4.5 MMOL/L (ref 3.5–5.1)
PROTHROMBIN TIME: 10.3 SEC (ref 9.8–13)
RBC # BLD: 3.43 M/UL (ref 4.2–5.9)
SODIUM BLD-SCNC: 140 MMOL/L (ref 136–145)
WBC # BLD: 5.7 K/UL (ref 4–11)

## 2018-09-28 PROCEDURE — 36415 COLL VENOUS BLD VENIPUNCTURE: CPT

## 2018-09-28 PROCEDURE — G8979 MOBILITY GOAL STATUS: HCPCS

## 2018-09-28 PROCEDURE — 92526 ORAL FUNCTION THERAPY: CPT

## 2018-09-28 PROCEDURE — 97530 THERAPEUTIC ACTIVITIES: CPT

## 2018-09-28 PROCEDURE — 85652 RBC SED RATE AUTOMATED: CPT

## 2018-09-28 PROCEDURE — 2580000003 HC RX 258: Performed by: STUDENT IN AN ORGANIZED HEALTH CARE EDUCATION/TRAINING PROGRAM

## 2018-09-28 PROCEDURE — 6360000002 HC RX W HCPCS: Performed by: STUDENT IN AN ORGANIZED HEALTH CARE EDUCATION/TRAINING PROGRAM

## 2018-09-28 PROCEDURE — G8978 MOBILITY CURRENT STATUS: HCPCS

## 2018-09-28 PROCEDURE — 84425 ASSAY OF VITAMIN B-1: CPT

## 2018-09-28 PROCEDURE — 85025 COMPLETE CBC W/AUTO DIFF WBC: CPT

## 2018-09-28 PROCEDURE — 80048 BASIC METABOLIC PNL TOTAL CA: CPT

## 2018-09-28 PROCEDURE — C9113 INJ PANTOPRAZOLE SODIUM, VIA: HCPCS | Performed by: STUDENT IN AN ORGANIZED HEALTH CARE EDUCATION/TRAINING PROGRAM

## 2018-09-28 PROCEDURE — 2580000003 HC RX 258: Performed by: FAMILY MEDICINE

## 2018-09-28 PROCEDURE — 6370000000 HC RX 637 (ALT 250 FOR IP): Performed by: INTERNAL MEDICINE

## 2018-09-28 PROCEDURE — 97162 PT EVAL MOD COMPLEX 30 MIN: CPT

## 2018-09-28 PROCEDURE — 2060000000 HC ICU INTERMEDIATE R&B

## 2018-09-28 PROCEDURE — 2500000003 HC RX 250 WO HCPCS: Performed by: STUDENT IN AN ORGANIZED HEALTH CARE EDUCATION/TRAINING PROGRAM

## 2018-09-28 PROCEDURE — 82746 ASSAY OF FOLIC ACID SERUM: CPT

## 2018-09-28 PROCEDURE — G8988 SELF CARE GOAL STATUS: HCPCS

## 2018-09-28 PROCEDURE — 97167 OT EVAL HIGH COMPLEX 60 MIN: CPT

## 2018-09-28 PROCEDURE — 86140 C-REACTIVE PROTEIN: CPT

## 2018-09-28 PROCEDURE — 82533 TOTAL CORTISOL: CPT

## 2018-09-28 PROCEDURE — 86780 TREPONEMA PALLIDUM: CPT

## 2018-09-28 PROCEDURE — G8987 SELF CARE CURRENT STATUS: HCPCS

## 2018-09-28 PROCEDURE — 85610 PROTHROMBIN TIME: CPT

## 2018-09-28 RX ADMIN — LEVOTHYROXINE SODIUM ANHYDROUS 50 MCG: 100 INJECTION, POWDER, LYOPHILIZED, FOR SOLUTION INTRAVENOUS at 15:04

## 2018-09-28 RX ADMIN — Medication 10 ML: at 15:05

## 2018-09-28 RX ADMIN — THIAMINE HYDROCHLORIDE 100 MG: 100 INJECTION, SOLUTION INTRAMUSCULAR; INTRAVENOUS at 08:10

## 2018-09-28 RX ADMIN — Medication 5 ML: at 15:04

## 2018-09-28 RX ADMIN — Medication 10 ML: at 09:10

## 2018-09-28 RX ADMIN — VANCOMYCIN HYDROCHLORIDE 1000 MG: 10 INJECTION, POWDER, LYOPHILIZED, FOR SOLUTION INTRAVENOUS at 01:53

## 2018-09-28 RX ADMIN — Medication 10 ML: at 09:12

## 2018-09-28 RX ADMIN — PANTOPRAZOLE SODIUM 40 MG: 40 INJECTION, POWDER, FOR SOLUTION INTRAVENOUS at 09:03

## 2018-09-28 RX ADMIN — HEPARIN SODIUM 5000 UNITS: 5000 INJECTION INTRAVENOUS; SUBCUTANEOUS at 23:03

## 2018-09-28 RX ADMIN — HEPARIN SODIUM 5000 UNITS: 5000 INJECTION INTRAVENOUS; SUBCUTANEOUS at 15:04

## 2018-09-28 RX ADMIN — PIPERACILLIN SODIUM AND TAZOBACTAM SODIUM 3.38 G: 3; .375 INJECTION, POWDER, LYOPHILIZED, FOR SOLUTION INTRAVENOUS at 13:10

## 2018-09-28 RX ADMIN — Medication 10 ML: at 23:02

## 2018-09-28 RX ADMIN — POTASSIUM CHLORIDE, DEXTROSE MONOHYDRATE AND SODIUM CHLORIDE: 300; 5; 450 INJECTION, SOLUTION INTRAVENOUS at 00:08

## 2018-09-28 RX ADMIN — PIPERACILLIN SODIUM AND TAZOBACTAM SODIUM 3.38 G: 3; .375 INJECTION, POWDER, LYOPHILIZED, FOR SOLUTION INTRAVENOUS at 04:40

## 2018-09-28 RX ADMIN — HEPARIN SODIUM 5000 UNITS: 5000 INJECTION INTRAVENOUS; SUBCUTANEOUS at 06:30

## 2018-09-28 RX ADMIN — PIPERACILLIN SODIUM AND TAZOBACTAM SODIUM 3.38 G: 3; .375 INJECTION, POWDER, LYOPHILIZED, FOR SOLUTION INTRAVENOUS at 23:02

## 2018-09-28 ASSESSMENT — PAIN SCALES - GENERAL
PAINLEVEL_OUTOF10: 0

## 2018-09-28 NOTE — PROGRESS NOTES
visit, does not demonstrate full comprehension    Compensatory Strategies:  n/a     Plan:  Continued daily Dysphagia treatment with goals per  plan of care. Diet recommendations: NPO  Aggressive oral care - small amounts of ice chips throughout the day per RN only, for comfort   DC recommendation: TBD closer to dc  Treatment: 15  D/W nursing Jesus Krishna and MD  Needs met prior to leaving room, call button in reach. Brittany Major M.S./CCC-SLP #[id number]  Pg.  # [phone]    If patient is discharged prior to next treatment, this note will serve as the discharge summary

## 2018-09-28 NOTE — CONSULTS
behavior noted. Neurologic  Neurologic  Mental status: orientation limited examination due to encephalopathy. Able to state his name only confused to time and place              General fund of knowledge  limited examination due to encephalopathy.              Memory  limited examination due to encephalopathy.              Attention  limited examination due to encephalopathy.              Language  limited examination due to encephalopathy.              Comprehension  limited examination due to encephalopathy. Cranial nerves:   CN2:  limited examination due to encephalopathy. CN 3,4,6:  limited examination due to encephalopathy. PERRL. CN5:  limited examination due to encephalopathy. CN7: No facial droop appreciated. CN8: hearing grossly intact  CN9:  limited examination due to encephalopathy. CN11:  limited examination due to encephalopathy. CN12: limited examination due to encephalopathy. Strength: Moving bilateral arms and legs spontaneously. Unable to test strength. Deep tendon reflexes: Normal  Sensory: withdraws to tactile stimulation. Cerebellar/coordination:  limited examination due to encephalopathy. Tone: normal in all 4 extremities  Gait: Deferred at this time due to safety concerns. Imaging:    XR CHEST STANDARD (2 VW)   Final Result      Interval development of moderately large right lower lung pleural parenchymal opacity      CT ABDOMEN PELVIS W IV CONTRAST Additional Contrast? None   Final Result   1. Possible mild rectosigmoid colitis. 2.  Cholelithiasis with gallbladder distention. Correlate with    ultrasound. CT CHEST W CONTRAST   Final Result     Small right pleural effusion. Bilateral upper lobe nodules require    follow-up. CT HEAD WO CONTRAST   Final Result      1. No acute intracranial process. Low-density fluid collection in the scalp may represent a chronic hematoma versus cellulitis and edema. 2. Mild cerebral atrophy.       3. Mild chronic Platelets 073 629 - 903 K/uL    MPV 8.0 5.0 - 10.5 fL    Neutrophils % 83.9 %    Lymphocytes % 8.4 %    Monocytes % 6.6 %    Eosinophils % 0.9 %    Basophils % 0.2 %    Neutrophils # 4.8 1.7 - 7.7 K/uL    Lymphocytes # 0.5 (L) 1.0 - 5.1 K/uL    Monocytes # 0.4 0.0 - 1.3 K/uL    Eosinophils # 0.0 0.0 - 0.6 K/uL    Basophils # 0.0 0.0 - 0.2 K/uL   Basic metabolic panel    Collection Time: 09/28/18  5:21 AM   Result Value Ref Range    Sodium 140 136 - 145 mmol/L    Potassium 4.5 3.5 - 5.1 mmol/L    Chloride 105 99 - 110 mmol/L    CO2 26 21 - 32 mmol/L    Anion Gap 9 3 - 16    Glucose 105 (H) 70 - 99 mg/dL    BUN 8 7 - 20 mg/dL    CREATININE 0.7 (L) 0.8 - 1.3 mg/dL    GFR Non-African American >60 >60    GFR African American >60 >60    Calcium 7.3 (L) 8.3 - 10.6 mg/dL   Protime-INR    Collection Time: 09/28/18  5:21 AM   Result Value Ref Range    Protime 10.3 9.8 - 13.0 sec    INR 0.90 0.86 - 1.14   POCT Glucose    Collection Time: 09/28/18  8:13 AM   Result Value Ref Range    POC Glucose 119 (H) 70 - 99 mg/dl    Performed on ACCU-CHEK    POCT Glucose    Collection Time: 09/28/18 12:30 PM   Result Value Ref Range    POC Glucose 109 (H) 70 - 99 mg/dl    Performed on ACCU-CHEK    POCT Glucose    Collection Time: 09/28/18  4:42 PM   Result Value Ref Range    POC Glucose 102 (H) 70 - 99 mg/dl    Performed on ACCU-CHEK        Scheduled Meds:   pantoprazole  40 mg Intravenous Daily    nicotine  1 patch Transdermal Daily    levothyroxine  50 mcg Intravenous Daily    And    sodium chloride (PF)  5 mL Intravenous Daily    sodium chloride flush  10 mL Intravenous 2 times per day    piperacillin-tazobactam  3.375 g Intravenous Q8H    sodium chloride flush  10 mL Intravenous 2 times per day    insulin lispro  0-6 Units Subcutaneous Q4H    heparin (porcine)  5,000 Units Subcutaneous 3 times per day       Continuous Infusions:    dextrose          ASSESSMENT & RECOMMENDATIONS:   Wyatt Holcomb 68 y/o male who presented with altered mental status and admitted for sepsis possibly due to colitis. He has a known history of ETOH abuse with no ethanol level detected on admission and found soiled with feces and urine and altered  which raises the concern if he might have suffered an alcohol withdrawal related seizure with post-ictal state. He remains confused possibly due to alcohol withdrawal, will exclude acute cerebral infarction, seizures and low suspicion for CNS infection. 1. Encephalopathy multifactorial   2. Sepsis  3. ETOH abuse   4. REGIS resolved. 5. Tobacco use    ==============  RECOMMENDATIONS:  1. MRI of the head wo/w contrast, when able  2. Routine EEG to look for any epileptogenic features. 3. He seems to be improving, so will hold of on CSF analysis. 4. Obtain metabolic and infectious encephalopathy workup per primary team.     A copy of this note was provided for Dr Luann Choudhury MD     Electronically signed by:    Roberto Thomas.  Shantelle Herrera MD  Consultant Neurologist  [address] [place] [address] Neuroscience  Ph: [phone]       9/28/2018,5:16 PM

## 2018-09-28 NOTE — PROGRESS NOTES
Physical Therapy    Facility/Department: [facility] [address] [facility]  Initial Assessment/treatment    NAME: Katie Diaz  : 1940  MRN: [id number]    Date of Service: 2018    Discharge Recommendations:    Katie Diaz scored a 8/24 on the AM-PAC short mobility form. Current research shows that an AM-PAC score of 17 or less is typically not associated with a discharge to the patient's home setting. Based on the patients AM-PAC score and their current functional mobility deficits, it is recommended that the patient have 3-5 sessions per week of Physical Therapy at d/c to increase the patients independence. PT Equipment Recommendations  Equipment Needed: No  Other: defer    Patient Diagnosis(es): The encounter diagnosis was Altered mental status, unspecified altered mental status type. has no past medical history on file. has no past surgical history on file. Restrictions  Position Activity Restriction  Other position/activity restrictions: no activity orders noted, seizure precautions  Vision/Hearing  Vision: Impaired (wears glasses)  Hearing: Exceptions to [facility] Exceptions: Hard of hearing/hearing concerns     Subjective  General  Chart Reviewed: Yes  Additional Pertinent Hx: Pt presents  by EMS after being found down and with AMS. Hx of dementia and alcohol abuse. Head CT \"shows no acute concern\" CT Abdomen/pelvis/chest \"Small right pleural effusion\" XR chest \"No acute pulmonary disease\"  Family / Caregiver Present: No  Referring Practitioner: Travon Yadav DO  Follows Commands: Within Functional Limits  Subjective  Subjective: Pt found supine in bed upon arrival, denying pain and agreeable to therapy. Confusion noted with conversation. Pain Screening  Patient Currently in Pain: No  Vital Signs  Patient Currently in Pain: No       Orientation  Orientation  Overall Orientation Status: Impaired  Orientation Level: Oriented to person;Disoriented to situation;Disoriented to place; Disoriented to time    Social/Functional History  Social/Functional History  Lives With: Alone  Type of Home: House  Home Layout: Multi-level, Able to Live on Main level with bedroom/bathroom  Home Access: Stairs to enter with rails  Entrance Stairs - Number of Steps: 2-3 YANNI  Bathroom Shower/Tub: Walk-in shower  Bathroom Toilet: Standard  Bathroom Equipment: Shower chair  Home Equipment:  (owns no AD. )  ADL Assistance: Independent  Homemaking Assistance: Independent  Ambulation Assistance: Independent  Transfer Assistance: Independent  Active : Yes  Mode of Transportation: Car  Occupation: Retired  Additional Comments: Pt able to provide above info on 9/28; pt questionable historian. Per  note dated 9/25:  Pt is from home alone. He is active with Adult Protective Services. According to [address] [place] [address] [place], pt was brought to their attention in August after an episode in which pt was driving to [place] but ended up in [place] in [place]. Pt has poor short term memory and limited remote memory at baseline. He is also generally confused. SW had tried to get pt to an appointment with his PCP, Dr. Elvia Bond, for an evaluation. Pt made the appointment but then refused to follow through. He is quite vulnerable in the community. He has no family and is supported by a few friends and his neighbor. He is ADL independent and drives (although safety in question). His neighbor has been helping him with grocery shopping. Pt eats only frozen meals, drinks \"a lot\" of alcohol, and smokes.    Objective          AROM RLE (degrees)  RLE General AROM: Adequate for functional mobility  AROM LLE (degrees)  LLE General AROM: Adequate for functional mobility   Strength RLE  Comment: Globally >3+/5 per pt's ability to perform functional mobility   Strength LLE  Comment: Globally >3+/5 per pt's ability to perform functional mobility         Bed mobility  Supine to Sit: Maximum assistance  Scooting: Maximal assistance (to

## 2018-09-28 NOTE — PROGRESS NOTES
Occupational Therapy   Occupational Therapy Initial Assessment and Treatment  Late Entry for 852  Date: 2018   Patient Name: Eryn Jackson  MRN: [id number]     : 1940    Date of Service: 2018    Discharge Recommendations:  Eryn Jackson scored a  on the AM-PAC ADL Inpatient form. Current research shows that an AM-PAC score of 17 or less is typically not associated with a discharge to the patient's home setting. Based on the patients AM-PAC score and their current ADL deficits, it is recommended that the patient have 3-5 sessions per week of Occupational Therapy at d/c to increase the patients independence. OT Equipment Recommendations  Equipment Needed: No  Other: defer recommendations to next level of care      Patient Diagnosis(es): The encounter diagnosis was Altered mental status, unspecified altered mental status type. has no past medical history on file. has no past surgical history on file. Treatment Diagnosis: impaired ADLs, transfer, mobility and decreased safety awareness      Restrictions  Position Activity Restriction  Other position/activity restrictions: no activity orders noted, seizure precautions    Subjective   General  Chart Reviewed: Yes  Additional Pertinent Hx: Pt admitted 18 after being found down at home. Hospital Course:  CT Head: neg; CT Chest: Small right pleural effusion and Bilateral upper lobe nodules require follow up; has new oxygen requirement overnight and being treated for CI bleed along with sepsis with mild rectosigmoid colitis. PMH includes: Gout, HTN  Family / Caregiver Present: No  Referring Practitioner: Dr. Nessa Benjamin  Diagnosis: Sepsis  Subjective  Subjective: Supine in bed on entry. Able to state name and birthday, not year of birth. Confusion per conversation. Repeatedly asking about the steps and the other room.    Pain Assessment  Patient Currently in Pain: No      Social/Functional History  Social/Functional endurance;Decreased balance  Assessment: Pt admitted from home after found on ground. Pt has AMS and demo confusion throughout interaction. Per SW note, pt lives alone and is active w/ APS. Pt is not safe to return home at discharge - is a high risk for recurrent falling and lacks safety awareness and insight. Pt will benefit from inpt OT at discharge. Continue per POC. Treatment Diagnosis: impaired ADLs, transfer, mobility and decreased safety awareness  Prognosis: Fair  Patient Education: role of OT - no learning, continue to teach prn  REQUIRES OT FOLLOW UP: Yes  Activity Tolerance  Activity Tolerance: Patient limited by fatigue;Treatment limited secondary to decreased cognition; vitals in chair: /85, , O2 sats on 4L - fluctuating from 85-90%  Safety Devices  Safety Devices in place: Yes  Type of devices: Nurse notified; Left in chair;Chair alarm in place;Call light within reach (chair reclined, safety care companion at bedside; nurse informed of recommendation to utilize Adrianna Loots x 2 assist for back to bed - communicated on white board in room as well)         Plan  This note will serve as a discharge summary in the event the patient is discharged prior to next treatment session. Plan  Times per week: 2-5  Times per day: Daily  Current Treatment Recommendations: Balance Training, Functional Mobility Training, Endurance Training, Safety Education & Training, Cognitive Reorientation, Self-Care / ADL    G-Code  OT G-codes  Functional Limitation: Self care  Self Care Current Status (): At least 60 percent but less than 80 percent impaired, limited or restricted  Self Care Goal Status ():  At least 40 percent but less than 60 percent impaired, limited or restricted                                             AM-PAC Score        AM-[facility] Inpatient Daily Activity Raw Score: 9  AM-PAC Inpatient ADL T-Scale Score : 25.33  ADL Inpatient CMS 0-100% Score: 79.59  ADL Inpatient CMS G-Code Modifier : CL    Goals  Short term goals  Time Frame for Short term goals: Discharge  Short term goal 1: simulated BSC transfer w/ Mod A of 2  Short term goal 2: supine to sit w/ Mod A  Short term goal 3: unsupported sitting w/ CGA in prep for seated ADLs  Short term goal 4: tolerate standing x 90 seconds at walker in prep for ADLs in standing  Patient Goals   Patient goals : no goal stated       Therapy Time   Individual Concurrent Group Co-treatment   Time In 0810         Time Out 0852         Minutes 42           Timed Code Treatment Minutes:  27 Minutes    Total Treatment Minutes:  [address] [facility] OTR/L #[id number]

## 2018-09-28 NOTE — PROGRESS NOTES
Assessment/Plan:    Chen Martínez, 69 y.o. male w/ PMH of HTN, gout and alcoholism who presents to ED from home via EMS after being found down at home for unknown length of time. Active Hospital Problems    Diagnosis Date Noted    Altered mental status [R41.82]     SOB (shortness of breath) [R06.02]     Goals of care, counseling/discussion [Z71.89]     Encounter for palliative care [Z51.5]     Sepsis ([facility] [address] [A41.9] 09/25/2018     Anion gap metabolic acidosis likely 2/2 alcoholic ketoacidosis   Patient presented with AGMA and significant elevation of serum and urine ketones. Patient has h/o alcoholism. AGMA resolved after giving patient dextrose. Unlikely 2/2 ethylene glycol ingestion due to no crystals on U/A and resolution of AGMA with dextrose.   - follow BMP  - continue to replete K, Mg and PO4 as needed    Hypokalemia  Patient presented with K at 2.6 on admission. Patient has total body K deficit.    - continue to replace K with IV KCl   - continue to replace Mg as needed    REGIS, resolved  - monitor renal funciton       Diet: Diet NPO Effective Now  Code Status: Full Code      Monihsa Aguillon MD    d - had Alcoholic ketosis

## 2018-09-29 ENCOUNTER — APPOINTMENT (OUTPATIENT)
Dept: GENERAL RADIOLOGY | Age: 78
DRG: 871 | End: 2018-09-29
Payer: MEDICARE

## 2018-09-29 LAB
ALBUMIN SERPL-MCNC: 2.3 G/DL (ref 3.4–5)
ANION GAP SERPL CALCULATED.3IONS-SCNC: 10 MMOL/L (ref 3–16)
BASOPHILS ABSOLUTE: 0 K/UL (ref 0–0.2)
BASOPHILS RELATIVE PERCENT: 0.5 %
BUN BLDV-MCNC: 7 MG/DL (ref 7–20)
C-REACTIVE PROTEIN: 40.3 MG/L (ref 0–5.1)
CALCIUM SERPL-MCNC: 7.7 MG/DL (ref 8.3–10.6)
CHLORIDE BLD-SCNC: 106 MMOL/L (ref 99–110)
CO2: 26 MMOL/L (ref 21–32)
CORTISOL TOTAL: 14.7 UG/DL
CREAT SERPL-MCNC: 0.7 MG/DL (ref 0.8–1.3)
EOSINOPHILS ABSOLUTE: 0.1 K/UL (ref 0–0.6)
EOSINOPHILS RELATIVE PERCENT: 0.9 %
FOLATE: 3.87 NG/ML (ref 4.78–24.2)
GFR AFRICAN AMERICAN: >60
GFR NON-AFRICAN AMERICAN: >60
GLUCOSE BLD-MCNC: 73 MG/DL (ref 70–99)
GLUCOSE BLD-MCNC: 75 MG/DL (ref 70–99)
GLUCOSE BLD-MCNC: 77 MG/DL (ref 70–99)
GLUCOSE BLD-MCNC: 78 MG/DL (ref 70–99)
GLUCOSE BLD-MCNC: 79 MG/DL (ref 70–99)
GLUCOSE BLD-MCNC: 89 MG/DL (ref 70–99)
GLUCOSE BLD-MCNC: 96 MG/DL (ref 70–99)
HCT VFR BLD CALC: 38.5 % (ref 40.5–52.5)
HEMOGLOBIN: 13.1 G/DL (ref 13.5–17.5)
INR BLD: 0.97 (ref 0.86–1.14)
LYMPHOCYTES ABSOLUTE: 0.4 K/UL (ref 1–5.1)
LYMPHOCYTES RELATIVE PERCENT: 7.1 %
MCH RBC QN AUTO: 36.2 PG (ref 26–34)
MCHC RBC AUTO-ENTMCNC: 34.1 G/DL (ref 31–36)
MCV RBC AUTO: 106.2 FL (ref 80–100)
MONOCYTES ABSOLUTE: 0.5 K/UL (ref 0–1.3)
MONOCYTES RELATIVE PERCENT: 8 %
NEUTROPHILS ABSOLUTE: 4.8 K/UL (ref 1.7–7.7)
NEUTROPHILS RELATIVE PERCENT: 83.5 %
PDW BLD-RTO: 14.2 % (ref 12.4–15.4)
PERFORMED ON: NORMAL
PHOSPHORUS: 3.3 MG/DL (ref 2.5–4.9)
PLATELET # BLD: 181 K/UL (ref 135–450)
PMV BLD AUTO: 8.5 FL (ref 5–10.5)
POTASSIUM SERPL-SCNC: 4.3 MMOL/L (ref 3.5–5.1)
PROTHROMBIN TIME: 11.1 SEC (ref 9.8–13)
RBC # BLD: 3.62 M/UL (ref 4.2–5.9)
SEDIMENTATION RATE, ERYTHROCYTE: 30 MM/HR (ref 0–20)
SODIUM BLD-SCNC: 142 MMOL/L (ref 136–145)
TOTAL SYPHILLIS IGG/IGM: NORMAL
WBC # BLD: 5.7 K/UL (ref 4–11)

## 2018-09-29 PROCEDURE — 85025 COMPLETE CBC W/AUTO DIFF WBC: CPT

## 2018-09-29 PROCEDURE — 80069 RENAL FUNCTION PANEL: CPT

## 2018-09-29 PROCEDURE — 85610 PROTHROMBIN TIME: CPT

## 2018-09-29 PROCEDURE — 74018 RADEX ABDOMEN 1 VIEW: CPT

## 2018-09-29 PROCEDURE — 6360000002 HC RX W HCPCS: Performed by: STUDENT IN AN ORGANIZED HEALTH CARE EDUCATION/TRAINING PROGRAM

## 2018-09-29 PROCEDURE — 2580000003 HC RX 258: Performed by: INTERNAL MEDICINE

## 2018-09-29 PROCEDURE — 2580000003 HC RX 258: Performed by: FAMILY MEDICINE

## 2018-09-29 PROCEDURE — 2580000003 HC RX 258: Performed by: STUDENT IN AN ORGANIZED HEALTH CARE EDUCATION/TRAINING PROGRAM

## 2018-09-29 PROCEDURE — 2500000003 HC RX 250 WO HCPCS: Performed by: STUDENT IN AN ORGANIZED HEALTH CARE EDUCATION/TRAINING PROGRAM

## 2018-09-29 PROCEDURE — C9113 INJ PANTOPRAZOLE SODIUM, VIA: HCPCS | Performed by: STUDENT IN AN ORGANIZED HEALTH CARE EDUCATION/TRAINING PROGRAM

## 2018-09-29 PROCEDURE — 95819 EEG AWAKE AND ASLEEP: CPT

## 2018-09-29 PROCEDURE — 2060000000 HC ICU INTERMEDIATE R&B

## 2018-09-29 PROCEDURE — 36415 COLL VENOUS BLD VENIPUNCTURE: CPT

## 2018-09-29 PROCEDURE — 6370000000 HC RX 637 (ALT 250 FOR IP): Performed by: INTERNAL MEDICINE

## 2018-09-29 PROCEDURE — 2500000003 HC RX 250 WO HCPCS: Performed by: INTERNAL MEDICINE

## 2018-09-29 PROCEDURE — 6360000002 HC RX W HCPCS: Performed by: INTERNAL MEDICINE

## 2018-09-29 RX ORDER — DEXTROSE AND SODIUM CHLORIDE 5; .45 G/100ML; G/100ML
INJECTION, SOLUTION INTRAVENOUS CONTINUOUS
Status: DISCONTINUED | OUTPATIENT
Start: 2018-09-29 | End: 2018-10-03 | Stop reason: HOSPADM

## 2018-09-29 RX ADMIN — DEXTROSE AND SODIUM CHLORIDE: 5; 450 INJECTION, SOLUTION INTRAVENOUS at 15:32

## 2018-09-29 RX ADMIN — PANTOPRAZOLE SODIUM 40 MG: 40 INJECTION, POWDER, FOR SOLUTION INTRAVENOUS at 09:50

## 2018-09-29 RX ADMIN — HEPARIN SODIUM 5000 UNITS: 5000 INJECTION INTRAVENOUS; SUBCUTANEOUS at 06:41

## 2018-09-29 RX ADMIN — FOLIC ACID: 5 INJECTION, SOLUTION INTRAMUSCULAR; INTRAVENOUS; SUBCUTANEOUS at 14:00

## 2018-09-29 RX ADMIN — Medication 5 ML: at 10:09

## 2018-09-29 RX ADMIN — Medication 10 ML: at 10:09

## 2018-09-29 RX ADMIN — LEVOTHYROXINE SODIUM ANHYDROUS 50 MCG: 100 INJECTION, POWDER, LYOPHILIZED, FOR SOLUTION INTRAVENOUS at 16:40

## 2018-09-29 RX ADMIN — PIPERACILLIN SODIUM AND TAZOBACTAM SODIUM 3.38 G: 3; .375 INJECTION, POWDER, LYOPHILIZED, FOR SOLUTION INTRAVENOUS at 06:41

## 2018-09-29 RX ADMIN — HEPARIN SODIUM 5000 UNITS: 5000 INJECTION INTRAVENOUS; SUBCUTANEOUS at 22:10

## 2018-09-29 RX ADMIN — HEPARIN SODIUM 5000 UNITS: 5000 INJECTION INTRAVENOUS; SUBCUTANEOUS at 15:32

## 2018-09-29 RX ADMIN — Medication 10 ML: at 01:46

## 2018-09-29 ASSESSMENT — PAIN SCALES - GENERAL
PAINLEVEL_OUTOF10: 0

## 2018-09-29 NOTE — PROGRESS NOTES
Nephrology Progress Note    Good UO   Cr stable   Lytes stable       Medications:  Reviewed    Infusion Medications    dextrose 5 % and 0.45 % NaCl      dextrose       Scheduled Medications    folic acid, thiamine, multi-vitamin with vitamin K infusion   Intravenous Once    pantoprazole  40 mg Intravenous Daily    nicotine  1 patch Transdermal Daily    levothyroxine  50 mcg Intravenous Daily    And    sodium chloride (PF)  5 mL Intravenous Daily    sodium chloride flush  10 mL Intravenous 2 times per day    piperacillin-tazobactam  3.375 g Intravenous Q8H    sodium chloride flush  10 mL Intravenous 2 times per day    insulin lispro  0-6 Units Subcutaneous Q4H    heparin (porcine)  5,000 Units Subcutaneous 3 times per day     PRN Meds: sodium chloride flush, magnesium hydroxide, ondansetron, sodium chloride flush, glucose, dextrose, glucagon (rDNA), dextrose      Intake/Output Summary (Last 24 hours) at 09/29/18 1314  Last data filed at 09/29/18 0940   Gross per 24 hour   Intake              100 ml   Output              900 ml   Net             -800 ml       Physical Exam Performed:    BP (!) 135/91   Pulse 116   Temp 96.5 °F (35.8 °C) (Oral)   Resp 16   Ht 5' 8\" (1.727 m)   Wt 132 lb 0.9 oz (59.9 kg)   SpO2 93%   BMI 20.08 kg/m²     Aletered   Skin: no rash, turgor wnl  Heent: PERRLA, EOMI  Neck: no bruits or jvd noted  Cardiovascular: tachycardic, regular rhythm, no murmurs   Respiratory: CTA B without w/r/r  Abdomen: +bs, soft, distractible tenderness, nd  Ext: +1 pitting lower extremity edema  Musculoskeletal:  Rom, muscular strength intact    Labs:   Recent Labs      09/27/18 0618 09/27/18   1124  09/28/18   0521  09/29/18   0535   WBC  6.0   --   5.7  5.7   HGB  12.3*  12.2*  12.3*  13.1*   HCT  35.7*  35.4*  36.8*  38.5*   PLT  178   --   176  181     Recent Labs      09/26/18 1957 09/27/18 0618  09/28/18   0521  09/29/18   0535   NA   --   139  140  142   K   --   3.6  4.5  4.3   CL --   102  105  106   CO2   --   27  26  26   BUN   --   13  8  7   CREATININE   --   0.8  0.7*  0.7*   CALCIUM   --   7.2*  7.3*  7.7*   PHOS  2.2*  1.9*   --   3.3     No results for input(s): AST, ALT, BILIDIR, BILITOT, ALKPHOS in the last 72 hours. Recent Labs      09/28/18   0521  09/29/18   0535   INR  0.90  0.97     No results for input(s): CKTOTAL, TROPONINI in the last 72 hours. Urinalysis:      Lab Results   Component Value Date    NITRU Negative 09/24/2018    WBCUA 0-2 09/24/2018    RBCUA 3-5 09/24/2018    BLOODU LARGE 09/24/2018    SPECGRAV 1.025 09/24/2018    GLUCOSEU 4.0 09/25/2018       Radiology:  XR CHEST STANDARD (2 VW)   Final Result      Interval development of moderately large right lower lung pleural parenchymal opacity      CT ABDOMEN PELVIS W IV CONTRAST Additional Contrast? None   Final Result   1. Possible mild rectosigmoid colitis. 2.  Cholelithiasis with gallbladder distention. Correlate with    ultrasound. CT CHEST W CONTRAST   Final Result     Small right pleural effusion. Bilateral upper lobe nodules require    follow-up. CT HEAD WO CONTRAST   Final Result      1. No acute intracranial process. Low-density fluid collection in the scalp may represent a chronic hematoma versus cellulitis and edema. 2. Mild cerebral atrophy. 3. Mild chronic small vessel ischemic disease. XR CHEST PORTABLE   Final Result      No acute pulmonary disease. MRI BRAIN W WO CONTRAST    (Results Pending)   XR CHEST STANDARD (2 VW)    (Results Pending)           Assessment/Plan:    Sundra Stephanie, 69 y.o. male w/ PMH of HTN, gout and alcoholism who presents to ED from home via EMS after being found down at home for unknown length of time.      Active Hospital Problems    Diagnosis Date Noted    Pleural effusion [J90] 09/28/2018    Altered mental status [R41.82]     SOB (shortness of breath) [R06.02]     Goals of care, counseling/discussion [Z71.89]     Encounter for palliative care [Z51.5]     Sepsis ([facility] [address] [A41.9] 09/25/2018     Anion gap metabolic acidosis likely 2/2 alcoholic ketoacidosis   - better   - replace vitamins   - folate low   - agree with TF     Hypokalemia  - replace k and mag as needed     REGIS, resolved  - monitor renal funciton       Diet: Diet NPO Effective Now  Code Status: Belinda Meraz MD

## 2018-09-29 NOTE — PROGRESS NOTES
Resident Attestation Note:  Patient seen and evaluated with the medical resident. I was physically present during the critical portions of the service when performed by the resident including the assessment and management of the patient. I agree with the findings and plans as described. Briefly,  Sleepy, but arrousable. Oriented to self only. Follows commands  Doesn't have an apetite, says he doesn't want to eat    Encephaloapathy: present on admisison, improving, unsure of baseline. He is able to follow some commands and make a small conversation. Neurology following, recommended, MRI and EEG. Suspect this is 2/2 wernicke's encephalopathy, Hypothyroidism (labs showed low T4 0.8 and normal TSH, suggesting central hypothyroidism), bilateral upper lung upper lobe nodule, paraneoplastic? He continues to be on oxygen, off IVF, CXR with pleural effusion.  Will recheck CXR  Wean off O2 for SpO2 89 or greater  Hypoglycemia; will avoid continuous IVF on him with pleural effusion and low albumin  Continue to check POC glucose levels q 4 hours  He continues to be NPO, SLP failed  Corpak placed, will start tf once KUB confirmed, nutrition consulted  Getting one dose of rally pack per nephrology,   Lab Results   Component Value Date    FOLATE 3.87 (L) 09/28/2018   He needs to be on folic acid supplementation once able to take PO  AMPA score 8/24  Paliative care following and now patient DNR/DNI  CM in discussion about NH options on discharge  Having difficult to get in touch with [address]

## 2018-09-29 NOTE — PROGRESS NOTES
y.o. male with PMH of HTN, gout, and suspected alcoholism who presents to ED from home via EMS after being found down at home for unknown length of time. Found to have AG metabolic acidosis and leukocytosis and be septic.      Acute metabolic encephalopathy 2/2 infection vs other  Other considerations include wernicke's encephalopathy in setting of chronic alcoholism. Given rally pack on admission.    - Thiamine IV daily x 3 doses (completed)  - concern for hypothyroidism, (T4 0.8), cont levothyroxine IV 50 mcg daily, (TRH: 0.57)  - Psychology consulted  - Neuro following: EEG awake and asleep; MRI brain; RPR, ESR, CRP     Sepsis secondary to colitis vs other  SIRS (3/4) qSOFA (2/3) Lactate 2.21 at presentation that resolved with IVF. WBC 6.0 (9/27). HypoThermic at presentation > 94.6F. CT A/P shows possible mild rectosigmoid colitis. Other considerations included cholecystitis in setting of gall bladder distension with cholelithiasis. UA negative for infection. CT chest without infiltrates  - S/p 30ml/kg IVF in ED  - Continue vancomycin and Zosyn day 4  - BCx NGTD  - UCx negative     GI bleed: fobt pos (9/27)  - consider GI referral as outpatient  - H&H stable   - VSS     Macrocytosis with hx of alcoholism  .4 without anemia. Hx of folate and thiamine deficiency in setting of alcoholism  - Received rally pack in ED   - Thiamine IV daily x 3 (completed)  - Folate, B12 normal      HTN   - Pharmacy to do med rec, year ago on Lisinopril/HCTZ     Mild Rhabdomyolysis - resolved     AG Metabolic Acidosis likely 2/2 alcohol &/or starvation ketosis - resolved     Palliative care consulted to discuss [address]     DVT Prophylaxis: Lovenox   Diet: Diet NPO Effective Now (speech and language eval for dysphagia)   Code Status: Limited - DNI/DNR  Dispo - GMF until MRI, EEG completed. Likely need discharge to SNF    W/D/W/A    Cross-covering patient, please contact intern on call pager after noon.      Adriel Velasquez MD,

## 2018-09-29 NOTE — PROGRESS NOTES
144   --    --    --   139  140   K  2.8*   --   3.1*   --   3.6  4.5   CL  103   --    --    --   102  105   CO2  32   --    --    --   27  26   BUN  20   --    --    --   13  8   CREATININE  1.2   --    --    --   0.8  0.7*   CALCIUM  7.6*   --    --    --   7.2*  7.3*   PHOS   --   2.4*   --   2.2*  1.9*   --      No results for input(s): AST, ALT, BILIDIR, BILITOT, ALKPHOS in the last 72 hours. Recent Labs      09/28/18   0521   INR  0.90     Recent Labs      09/26/18   0535   CKTOTAL  687*       Urinalysis:      Lab Results   Component Value Date    NITRU Negative 09/24/2018    WBCUA 0-2 09/24/2018    RBCUA 3-5 09/24/2018    BLOODU LARGE 09/24/2018    SPECGRAV 1.025 09/24/2018    GLUCOSEU 4.0 09/25/2018       Radiology:  XR CHEST STANDARD (2 VW)   Final Result      Interval development of moderately large right lower lung pleural parenchymal opacity      CT ABDOMEN PELVIS W IV CONTRAST Additional Contrast? None   Final Result   1. Possible mild rectosigmoid colitis. 2.  Cholelithiasis with gallbladder distention. Correlate with    ultrasound. CT CHEST W CONTRAST   Final Result     Small right pleural effusion. Bilateral upper lobe nodules require    follow-up. CT HEAD WO CONTRAST   Final Result      1. No acute intracranial process. Low-density fluid collection in the scalp may represent a chronic hematoma versus cellulitis and edema. 2. Mild cerebral atrophy. 3. Mild chronic small vessel ischemic disease. XR CHEST PORTABLE   Final Result      No acute pulmonary disease. MRI BRAIN W WO CONTRAST    (Results Pending)           Assessment/Plan:    Og Kennedy, 69 y.o. male w/ PMH of HTN, gout and alcoholism who presents to ED from home via EMS after being found down at home for unknown length of time.      Active Hospital Problems    Diagnosis Date Noted    Pleural effusion [J90] 09/28/2018    Altered mental status [R41.82]     SOB (shortness of breath) [R06.02]

## 2018-09-30 ENCOUNTER — APPOINTMENT (OUTPATIENT)
Dept: GENERAL RADIOLOGY | Age: 78
DRG: 871 | End: 2018-09-30
Payer: MEDICARE

## 2018-09-30 LAB
ALBUMIN SERPL-MCNC: 2.3 G/DL (ref 3.4–5)
ANION GAP SERPL CALCULATED.3IONS-SCNC: 11 MMOL/L (ref 3–16)
BASOPHILS ABSOLUTE: 0 K/UL (ref 0–0.2)
BASOPHILS RELATIVE PERCENT: 0.2 %
BLOOD CULTURE, ROUTINE: NORMAL
BUN BLDV-MCNC: 7 MG/DL (ref 7–20)
CALCIUM SERPL-MCNC: 7.7 MG/DL (ref 8.3–10.6)
CHLORIDE BLD-SCNC: 102 MMOL/L (ref 99–110)
CO2: 26 MMOL/L (ref 21–32)
CREAT SERPL-MCNC: 0.7 MG/DL (ref 0.8–1.3)
CULTURE, BLOOD 2: NORMAL
EOSINOPHILS ABSOLUTE: 0.1 K/UL (ref 0–0.6)
EOSINOPHILS RELATIVE PERCENT: 2.1 %
GFR AFRICAN AMERICAN: >60
GFR NON-AFRICAN AMERICAN: >60
GLUCOSE BLD-MCNC: 106 MG/DL (ref 70–99)
GLUCOSE BLD-MCNC: 106 MG/DL (ref 70–99)
GLUCOSE BLD-MCNC: 121 MG/DL (ref 70–99)
GLUCOSE BLD-MCNC: 125 MG/DL (ref 70–99)
GLUCOSE BLD-MCNC: 86 MG/DL (ref 70–99)
GLUCOSE BLD-MCNC: 91 MG/DL (ref 70–99)
GLUCOSE BLD-MCNC: 95 MG/DL (ref 70–99)
HCT VFR BLD CALC: 40.5 % (ref 40.5–52.5)
HEMOGLOBIN: 13.5 G/DL (ref 13.5–17.5)
INR BLD: 0.92 (ref 0.86–1.14)
LYMPHOCYTES ABSOLUTE: 0.6 K/UL (ref 1–5.1)
LYMPHOCYTES RELATIVE PERCENT: 9.6 %
MCH RBC QN AUTO: 35.9 PG (ref 26–34)
MCHC RBC AUTO-ENTMCNC: 33.4 G/DL (ref 31–36)
MCV RBC AUTO: 107.3 FL (ref 80–100)
MONOCYTES ABSOLUTE: 0.5 K/UL (ref 0–1.3)
MONOCYTES RELATIVE PERCENT: 8.4 %
NEUTROPHILS ABSOLUTE: 4.9 K/UL (ref 1.7–7.7)
NEUTROPHILS RELATIVE PERCENT: 79.7 %
PDW BLD-RTO: 14.3 % (ref 12.4–15.4)
PERFORMED ON: ABNORMAL
PERFORMED ON: NORMAL
PERFORMED ON: NORMAL
PHOSPHORUS: 2.9 MG/DL (ref 2.5–4.9)
PLATELET # BLD: 195 K/UL (ref 135–450)
PMV BLD AUTO: 8 FL (ref 5–10.5)
POTASSIUM SERPL-SCNC: 3.9 MMOL/L (ref 3.5–5.1)
PROTHROMBIN TIME: 10.5 SEC (ref 9.8–13)
RBC # BLD: 3.77 M/UL (ref 4.2–5.9)
SODIUM BLD-SCNC: 139 MMOL/L (ref 136–145)
WBC # BLD: 6.1 K/UL (ref 4–11)

## 2018-09-30 PROCEDURE — 36415 COLL VENOUS BLD VENIPUNCTURE: CPT

## 2018-09-30 PROCEDURE — 74018 RADEX ABDOMEN 1 VIEW: CPT

## 2018-09-30 PROCEDURE — 6370000000 HC RX 637 (ALT 250 FOR IP): Performed by: INTERNAL MEDICINE

## 2018-09-30 PROCEDURE — C9113 INJ PANTOPRAZOLE SODIUM, VIA: HCPCS | Performed by: STUDENT IN AN ORGANIZED HEALTH CARE EDUCATION/TRAINING PROGRAM

## 2018-09-30 PROCEDURE — 2580000003 HC RX 258: Performed by: INTERNAL MEDICINE

## 2018-09-30 PROCEDURE — 2700000000 HC OXYGEN THERAPY PER DAY

## 2018-09-30 PROCEDURE — 80069 RENAL FUNCTION PANEL: CPT

## 2018-09-30 PROCEDURE — 85025 COMPLETE CBC W/AUTO DIFF WBC: CPT

## 2018-09-30 PROCEDURE — 6360000002 HC RX W HCPCS: Performed by: STUDENT IN AN ORGANIZED HEALTH CARE EDUCATION/TRAINING PROGRAM

## 2018-09-30 PROCEDURE — 71046 X-RAY EXAM CHEST 2 VIEWS: CPT

## 2018-09-30 PROCEDURE — 2500000003 HC RX 250 WO HCPCS: Performed by: STUDENT IN AN ORGANIZED HEALTH CARE EDUCATION/TRAINING PROGRAM

## 2018-09-30 PROCEDURE — 2580000003 HC RX 258: Performed by: FAMILY MEDICINE

## 2018-09-30 PROCEDURE — 94760 N-INVAS EAR/PLS OXIMETRY 1: CPT

## 2018-09-30 PROCEDURE — 94150 VITAL CAPACITY TEST: CPT

## 2018-09-30 PROCEDURE — 2060000000 HC ICU INTERMEDIATE R&B

## 2018-09-30 PROCEDURE — 85610 PROTHROMBIN TIME: CPT

## 2018-09-30 PROCEDURE — 2580000003 HC RX 258: Performed by: STUDENT IN AN ORGANIZED HEALTH CARE EDUCATION/TRAINING PROGRAM

## 2018-09-30 RX ADMIN — HEPARIN SODIUM 5000 UNITS: 5000 INJECTION INTRAVENOUS; SUBCUTANEOUS at 22:12

## 2018-09-30 RX ADMIN — DEXTROSE AND SODIUM CHLORIDE: 5; 450 INJECTION, SOLUTION INTRAVENOUS at 11:29

## 2018-09-30 RX ADMIN — PANTOPRAZOLE SODIUM 40 MG: 40 INJECTION, POWDER, FOR SOLUTION INTRAVENOUS at 09:21

## 2018-09-30 RX ADMIN — HEPARIN SODIUM 5000 UNITS: 5000 INJECTION INTRAVENOUS; SUBCUTANEOUS at 15:57

## 2018-09-30 RX ADMIN — HEPARIN SODIUM 5000 UNITS: 5000 INJECTION INTRAVENOUS; SUBCUTANEOUS at 06:11

## 2018-09-30 RX ADMIN — LEVOTHYROXINE SODIUM ANHYDROUS 50 MCG: 100 INJECTION, POWDER, LYOPHILIZED, FOR SOLUTION INTRAVENOUS at 09:45

## 2018-09-30 RX ADMIN — Medication 5 ML: at 09:36

## 2018-09-30 RX ADMIN — Medication 10 ML: at 09:20

## 2018-09-30 ASSESSMENT — PAIN SCALES - GENERAL
PAINLEVEL_OUTOF10: 0

## 2018-09-30 NOTE — PROGRESS NOTES
in ED   - Thiamine IV daily x 3 (completed)  - Folate, B12 normal      HTN   - Pharmacy to do med rec, year ago on Lisinopril/HCTZ     Mild Rhabdomyolysis - resolved     AG Metabolic Acidosis likely 2/2 alcohol &/or starvation ketosis - resolved     Palliative care consulted to discuss GOC       DVT Prophylaxis: Lovenox   Diet: DIET TUBE FEED CONTINUOUS/CYCLIC NPO; STANDARD WITH FIBER; Nasogastric; Continuous; 25 (speech and language eval for dysphagia)   Code Status: Limited - DNI/DNR  Dispo - GMF until MRI, EEG completed. Likely need discharge to SNF    W/D/W/A    Cross-covering patient, please contact intern on call pager after noon.      Silvestre Strickland MD, PGY3  [phone]  09/30/18  12:03 PM

## 2018-09-30 NOTE — PROGRESS NOTES
Notified oncall resident about corpak placement. Abdominal  x-ray showed tube looped on itself. Tube feed is not started due to placement. Awaiting orders.

## 2018-09-30 NOTE — PLAN OF CARE
Problem: Nutrition  Intervention: Swallowing evaluation  Swallow evaluation: Nursing Bedside swallow evaluation done in the presence of Dr. Raisa Caraballo this afternoon. Started with ice chips and ended with apple sauce. Tolerated very well. Plans to continue tube feeding as ordered and to give ice chips and apple sauce as tolerated.

## 2018-09-30 NOTE — PROGRESS NOTES
last 72 hours. Recent Labs      09/28/18   0521  09/29/18   0535  09/30/18   0535   INR  0.90  0.97  0.92     No results for input(s): CKTOTAL, TROPONINI in the last 72 hours. Urinalysis:      Lab Results   Component Value Date    NITRU Negative 09/24/2018    WBCUA 0-2 09/24/2018    RBCUA 3-5 09/24/2018    BLOODU LARGE 09/24/2018    SPECGRAV 1.025 09/24/2018    GLUCOSEU 4.0 09/25/2018       Radiology:  XR ABDOMEN (KUB) (SINGLE AP VIEW)   Final Result   See above      XR ABDOMEN (KUB) (SINGLE AP VIEW)   Final Result   See above      XR CHEST STANDARD (2 VW)   Final Result      Increasing size of effusions with bibasilar atelectasis. Questionable tip of feeding tube at gastroesophageal junction. Single view abdomen recommended      Findings called to patient's floor nurse, Miriam Hong, on 9/30/2018 at 9:17 AM                  XR ABDOMEN (KUB) (SINGLE AP VIEW)   Final Result   Gastric tube coiled in the cardia of the stomach          XR ABDOMEN (KUB) (SINGLE AP VIEW)   Final Result      XR ABDOMEN (KUB) (SINGLE AP VIEW)   Final Result      A Corpak feeding tube is in place with the tip at the level of the gastroesophageal junction. Advancement is recommended. XR CHEST STANDARD (2 VW)   Final Result      Interval development of moderately large right lower lung pleural parenchymal opacity      CT ABDOMEN PELVIS W IV CONTRAST Additional Contrast? None   Final Result   1. Possible mild rectosigmoid colitis. 2.  Cholelithiasis with gallbladder distention. Correlate with    ultrasound. CT CHEST W CONTRAST   Final Result     Small right pleural effusion. Bilateral upper lobe nodules require    follow-up. CT HEAD WO CONTRAST   Final Result      1. No acute intracranial process. Low-density fluid collection in the scalp may represent a chronic hematoma versus cellulitis and edema. 2. Mild cerebral atrophy. 3. Mild chronic small vessel ischemic disease.          XR CHEST

## 2018-10-01 ENCOUNTER — APPOINTMENT (OUTPATIENT)
Dept: GENERAL RADIOLOGY | Age: 78
DRG: 871 | End: 2018-10-01
Payer: MEDICARE

## 2018-10-01 LAB
ALBUMIN SERPL-MCNC: 2.1 G/DL (ref 3.4–5)
ANION GAP SERPL CALCULATED.3IONS-SCNC: 8 MMOL/L (ref 3–16)
BASOPHILS ABSOLUTE: 0 K/UL (ref 0–0.2)
BASOPHILS RELATIVE PERCENT: 0.2 %
BUN BLDV-MCNC: 7 MG/DL (ref 7–20)
CALCIUM SERPL-MCNC: 7.7 MG/DL (ref 8.3–10.6)
CHLORIDE BLD-SCNC: 103 MMOL/L (ref 99–110)
CO2: 28 MMOL/L (ref 21–32)
CREAT SERPL-MCNC: 0.7 MG/DL (ref 0.8–1.3)
EOSINOPHILS ABSOLUTE: 0.1 K/UL (ref 0–0.6)
EOSINOPHILS RELATIVE PERCENT: 1.9 %
GFR AFRICAN AMERICAN: >60
GFR NON-AFRICAN AMERICAN: >60
GLUCOSE BLD-MCNC: 109 MG/DL (ref 70–99)
GLUCOSE BLD-MCNC: 126 MG/DL (ref 70–99)
GLUCOSE BLD-MCNC: 84 MG/DL (ref 70–99)
GLUCOSE BLD-MCNC: 86 MG/DL (ref 70–99)
GLUCOSE BLD-MCNC: 92 MG/DL (ref 70–99)
GLUCOSE BLD-MCNC: 95 MG/DL (ref 70–99)
GLUCOSE BLD-MCNC: 99 MG/DL (ref 70–99)
HCT VFR BLD CALC: 37.1 % (ref 40.5–52.5)
HEMOGLOBIN: 12.6 G/DL (ref 13.5–17.5)
INR BLD: 0.95 (ref 0.86–1.14)
LYMPHOCYTES ABSOLUTE: 0.7 K/UL (ref 1–5.1)
LYMPHOCYTES RELATIVE PERCENT: 8.9 %
MAGNESIUM: 1.2 MG/DL (ref 1.8–2.4)
MCH RBC QN AUTO: 35.8 PG (ref 26–34)
MCHC RBC AUTO-ENTMCNC: 34 G/DL (ref 31–36)
MCV RBC AUTO: 105.5 FL (ref 80–100)
MONOCYTES ABSOLUTE: 0.8 K/UL (ref 0–1.3)
MONOCYTES RELATIVE PERCENT: 10.6 %
NEUTROPHILS ABSOLUTE: 5.9 K/UL (ref 1.7–7.7)
NEUTROPHILS RELATIVE PERCENT: 78.4 %
PDW BLD-RTO: 14 % (ref 12.4–15.4)
PERFORMED ON: ABNORMAL
PERFORMED ON: ABNORMAL
PERFORMED ON: NORMAL
PHOSPHORUS: 2.5 MG/DL (ref 2.5–4.9)
PLATELET # BLD: 212 K/UL (ref 135–450)
PMV BLD AUTO: 8.2 FL (ref 5–10.5)
POTASSIUM SERPL-SCNC: 3.5 MMOL/L (ref 3.5–5.1)
PROTHROMBIN TIME: 10.8 SEC (ref 9.8–13)
RBC # BLD: 3.52 M/UL (ref 4.2–5.9)
SODIUM BLD-SCNC: 139 MMOL/L (ref 136–145)
T4 FREE: 1.7 NG/DL (ref 0.9–1.8)
TSH REFLEX: 2.5 UIU/ML (ref 0.27–4.2)
WBC # BLD: 7.5 K/UL (ref 4–11)

## 2018-10-01 PROCEDURE — 85610 PROTHROMBIN TIME: CPT

## 2018-10-01 PROCEDURE — 94761 N-INVAS EAR/PLS OXIMETRY MLT: CPT

## 2018-10-01 PROCEDURE — 36415 COLL VENOUS BLD VENIPUNCTURE: CPT

## 2018-10-01 PROCEDURE — 84439 ASSAY OF FREE THYROXINE: CPT

## 2018-10-01 PROCEDURE — 6360000002 HC RX W HCPCS: Performed by: STUDENT IN AN ORGANIZED HEALTH CARE EDUCATION/TRAINING PROGRAM

## 2018-10-01 PROCEDURE — 2700000000 HC OXYGEN THERAPY PER DAY

## 2018-10-01 PROCEDURE — 2580000003 HC RX 258: Performed by: STUDENT IN AN ORGANIZED HEALTH CARE EDUCATION/TRAINING PROGRAM

## 2018-10-01 PROCEDURE — 80069 RENAL FUNCTION PANEL: CPT

## 2018-10-01 PROCEDURE — 2580000003 HC RX 258: Performed by: INTERNAL MEDICINE

## 2018-10-01 PROCEDURE — 84443 ASSAY THYROID STIM HORMONE: CPT

## 2018-10-01 PROCEDURE — C9113 INJ PANTOPRAZOLE SODIUM, VIA: HCPCS | Performed by: STUDENT IN AN ORGANIZED HEALTH CARE EDUCATION/TRAINING PROGRAM

## 2018-10-01 PROCEDURE — 2500000003 HC RX 250 WO HCPCS: Performed by: STUDENT IN AN ORGANIZED HEALTH CARE EDUCATION/TRAINING PROGRAM

## 2018-10-01 PROCEDURE — 83735 ASSAY OF MAGNESIUM: CPT

## 2018-10-01 PROCEDURE — 6360000002 HC RX W HCPCS: Performed by: INTERNAL MEDICINE

## 2018-10-01 PROCEDURE — 94150 VITAL CAPACITY TEST: CPT

## 2018-10-01 PROCEDURE — 2060000000 HC ICU INTERMEDIATE R&B

## 2018-10-01 PROCEDURE — 85025 COMPLETE CBC W/AUTO DIFF WBC: CPT

## 2018-10-01 PROCEDURE — 92526 ORAL FUNCTION THERAPY: CPT

## 2018-10-01 PROCEDURE — 6370000000 HC RX 637 (ALT 250 FOR IP): Performed by: INTERNAL MEDICINE

## 2018-10-01 RX ORDER — QUETIAPINE FUMARATE 25 MG/1
25 TABLET, FILM COATED ORAL 2 TIMES DAILY
Status: DISCONTINUED | OUTPATIENT
Start: 2018-10-01 | End: 2018-10-01

## 2018-10-01 RX ORDER — THIAMINE HYDROCHLORIDE 100 MG/ML
500 INJECTION, SOLUTION INTRAMUSCULAR; INTRAVENOUS DAILY
Status: DISCONTINUED | OUTPATIENT
Start: 2018-10-01 | End: 2018-10-01 | Stop reason: CLARIF

## 2018-10-01 RX ORDER — THIAMINE HYDROCHLORIDE 100 MG/ML
100 INJECTION, SOLUTION INTRAMUSCULAR; INTRAVENOUS DAILY
Status: DISCONTINUED | OUTPATIENT
Start: 2018-10-01 | End: 2018-10-01 | Stop reason: CLARIF

## 2018-10-01 RX ORDER — LORAZEPAM 2 MG/ML
0.5 INJECTION INTRAMUSCULAR ONCE
Status: COMPLETED | OUTPATIENT
Start: 2018-10-01 | End: 2018-10-01

## 2018-10-01 RX ORDER — MAGNESIUM SULFATE IN WATER 40 MG/ML
2 INJECTION, SOLUTION INTRAVENOUS
Status: COMPLETED | OUTPATIENT
Start: 2018-10-01 | End: 2018-10-01

## 2018-10-01 RX ADMIN — LEVOTHYROXINE SODIUM ANHYDROUS 50 MCG: 100 INJECTION, POWDER, LYOPHILIZED, FOR SOLUTION INTRAVENOUS at 08:53

## 2018-10-01 RX ADMIN — THIAMINE HYDROCHLORIDE 500 MG: 100 INJECTION, SOLUTION INTRAMUSCULAR; INTRAVENOUS at 14:42

## 2018-10-01 RX ADMIN — HEPARIN SODIUM 5000 UNITS: 5000 INJECTION INTRAVENOUS; SUBCUTANEOUS at 21:43

## 2018-10-01 RX ADMIN — MAGNESIUM SULFATE HEPTAHYDRATE 2 G: 40 INJECTION, SOLUTION INTRAVENOUS at 17:23

## 2018-10-01 RX ADMIN — Medication 10 ML: at 21:41

## 2018-10-01 RX ADMIN — HEPARIN SODIUM 5000 UNITS: 5000 INJECTION INTRAVENOUS; SUBCUTANEOUS at 14:49

## 2018-10-01 RX ADMIN — PANTOPRAZOLE SODIUM 40 MG: 40 INJECTION, POWDER, FOR SOLUTION INTRAVENOUS at 08:52

## 2018-10-01 RX ADMIN — HEPARIN SODIUM 5000 UNITS: 5000 INJECTION INTRAVENOUS; SUBCUTANEOUS at 05:57

## 2018-10-01 RX ADMIN — Medication 5 ML: at 08:54

## 2018-10-01 RX ADMIN — MAGNESIUM SULFATE HEPTAHYDRATE 2 G: 40 INJECTION, SOLUTION INTRAVENOUS at 15:24

## 2018-10-01 RX ADMIN — LORAZEPAM 0.5 MG: 2 INJECTION INTRAMUSCULAR; INTRAVENOUS at 01:29

## 2018-10-01 RX ADMIN — THIAMINE HYDROCHLORIDE 100 MG: 100 INJECTION, SOLUTION INTRAMUSCULAR; INTRAVENOUS at 12:18

## 2018-10-01 RX ADMIN — DEXTROSE AND SODIUM CHLORIDE: 5; 450 INJECTION, SOLUTION INTRAVENOUS at 19:23

## 2018-10-01 ASSESSMENT — PAIN SCALES - PAIN ASSESSMENT IN ADVANCED DEMENTIA (PAINAD)
BREATHING: 0
FACIALEXPRESSION: 0
BODYLANGUAGE: 0
TOTALSCORE: 0
CONSOLABILITY: 0
NEGVOCALIZATION: 0
TOTALSCORE: 0
FACIALEXPRESSION: 0
NEGVOCALIZATION: 0
FACIALEXPRESSION: 0
TOTALSCORE: 0
NEGVOCALIZATION: 0
NEGVOCALIZATION: 0
BREATHING: 0
BREATHING: 0
TOTALSCORE: 0
NEGVOCALIZATION: 0
BODYLANGUAGE: 0
CONSOLABILITY: 0
NEGVOCALIZATION: 0
TOTALSCORE: 0
BREATHING: 0
FACIALEXPRESSION: 0
BODYLANGUAGE: 0
BREATHING: 0
FACIALEXPRESSION: 0
CONSOLABILITY: 0
TOTALSCORE: 0
BODYLANGUAGE: 0
CONSOLABILITY: 0
CONSOLABILITY: 0
BODYLANGUAGE: 0
NEGVOCALIZATION: 0
FACIALEXPRESSION: 0
BODYLANGUAGE: 0
BREATHING: 0
CONSOLABILITY: 0
CONSOLABILITY: 0
TOTALSCORE: 0
FACIALEXPRESSION: 0
BODYLANGUAGE: 0
BREATHING: 0

## 2018-10-01 ASSESSMENT — PAIN SCALES - GENERAL
PAINLEVEL_OUTOF10: 0

## 2018-10-01 NOTE — PROGRESS NOTES
gastroesophageal junction. Advancement is recommended. XR CHEST STANDARD (2 VW)   Final Result      Interval development of moderately large right lower lung pleural parenchymal opacity      CT ABDOMEN PELVIS W IV CONTRAST Additional Contrast? None   Final Result   1. Possible mild rectosigmoid colitis. 2.  Cholelithiasis with gallbladder distention. Correlate with    ultrasound. CT CHEST W CONTRAST   Final Result     Small right pleural effusion. Bilateral upper lobe nodules require    follow-up. CT HEAD WO CONTRAST   Final Result      1. No acute intracranial process. Low-density fluid collection in the scalp may represent a chronic hematoma versus cellulitis and edema. 2. Mild cerebral atrophy. 3. Mild chronic small vessel ischemic disease. XR CHEST PORTABLE   Final Result      No acute pulmonary disease. MRI BRAIN W WO CONTRAST    (Results Pending)           Assessment/Plan:  1. Acute metabolic encephalopathy  2. Wernicke's encephalopathy/Korsakoff psychosis. 3. Chronic alcohol dependence. 4. Dysphagia  5. ?Hypothyroidism     Plan  Start the patient on high-dose IV thiamine. Given agitation, we'll start the patient on low-dose Seroquel b.i.d. Continue with the provided with a nectar thick liquids for dysphagia. Check TSH and T4 again given tachycardia present. Hold on synthroid now. Check Mg. DVT Prophylaxis: Lovenox   Diet: DIET DYSPHAGIA I PUREED; Nectar Thick  Code Status: Limited    PT/OT Eval Status: Pending     Dispo - IP. Pending clinical improvement.      Leopold Blew, MD

## 2018-10-01 NOTE — PROGRESS NOTES
ICU Resident Note            Admit Date: 9/24/2018  ICU Day: ***  Vent Day:***   Vent Settings:    / / /     Antibiotics: ***      Interval history: ***      T-max:  Patient Vitals for the past 8 hrs:   BP Temp Temp src Pulse Resp SpO2   10/01/18 0851 - - - - 18 90 %   10/01/18 0850 130/85 97 °F (36.1 °C) Oral 116 18 91 %   10/01/18 0425 128/80 96.5 °F (35.8 °C) Oral 123 16 90 %       Intake/Output Summary (Last 24 hours) at 10/01/18 1156  Last data filed at 10/01/18 1140   Gross per 24 hour   Intake             2547 ml   Output              350 ml   Net             2197 ml         /85   Pulse 116   Temp 97 °F (36.1 °C) (Oral)   Resp 18   Ht 5' 8\" (1.727 m)   Wt 132 lb 0.9 oz (59.9 kg)   SpO2 90%   BMI 20.08 kg/m²     Medications:     Scheduled Meds:   thiamine  100 mg Intravenous Daily    QUEtiapine  25 mg Oral BID    pantoprazole  40 mg Intravenous Daily    nicotine  1 patch Transdermal Daily    levothyroxine  50 mcg Intravenous Daily    And    sodium chloride (PF)  5 mL Intravenous Daily    sodium chloride flush  10 mL Intravenous 2 times per day    sodium chloride flush  10 mL Intravenous 2 times per day    insulin lispro  0-6 Units Subcutaneous Q4H    heparin (porcine)  5,000 Units Subcutaneous 3 times per day     Continuous Infusions:   dextrose 5 % and 0.45 % NaCl 50 mL/hr at 09/30/18 1129    dextrose       PRN Meds:sodium chloride flush, magnesium hydroxide, sodium chloride flush, glucose, dextrose, glucagon (rDNA), dextrose    Objective:   Vitals:   T-max:  Patient Vitals for the past 8 hrs:   BP Temp Temp src Pulse Resp SpO2   10/01/18 0851 - - - - 18 90 %   10/01/18 0850 130/85 97 °F (36.1 °C) Oral 116 18 91 %   10/01/18 0425 128/80 96.5 °F (35.8 °C) Oral 123 16 90 %       Intake/Output Summary (Last 24 hours) at 10/01/18 1156  Last data filed at 10/01/18 1140   Gross per 24 hour   Intake             2547 ml   Output 350 ml   Net             2197 ml           I/O:    Intake/Output Summary (Last 24 hours) at 10/01/18 1156  Last data filed at 10/01/18 1140   Gross per 24 hour   Intake             2547 ml   Output              350 ml   Net             2197 ml       General: No acute distress, cooperative, appears stated age  Eye: PERRLA, EOMI, Normal Conjunctiva  HENT: Normocephalic,   Neck: Supple, Non-tender, no carotid bruit, no JVD,  Resp: CTAB, Non-labored respirations, BS equal, symmetrical expansion, no chest wall tenderness  Cardio: RRR, no murmurs appreciated, no edema  GI/: Soft, NT/ND, normal BS, no organomegaly  MSK: Normal ROM, Normal strength, No tenderness, No swelling, No deformity   Neuro: AO, Normal sensory, Normal motor, No focal defects, CN II-XII intact      LABS:    CBC: Recent Labs      09/29/18 0535  09/30/18   0535  10/01/18   0443   WBC  5.7  6.1  7.5   HGB  13.1*  13.5  12.6*   HCT  38.5*  40.5  37.1*   PLT  181  195  212   MCV  106.2*  107.3*  105.5*     Renal:  Recent Labs      09/29/18   0535  09/30/18   0535  10/01/18   0443   NA  142  139  139   K  4.3  3.9  3.5   CL  106  102  103   CO2  26  26  28   BUN  7  7  7   CREATININE  0.7*  0.7*  0.7*   GLUCOSE  77  91  95   CALCIUM  7.7*  7.7*  7.7*   PHOS  3.3  2.9  2.5   ANIONGAP  10  11  8     Hepatic: Recent Labs      09/29/18   0535  09/30/18   0535  10/01/18   0443   LABALBU  2.3*  2.3*  2.1*     Troponin: No results for input(s): TROPONINI in the last 72 hours. BNP: No results for input(s): BNP in the last 72 hours. Lipids: No results for input(s): CHOL, HDL in the last 72 hours. Invalid input(s): LDLCALCU, TRIGLYCERIDE  ABGs:  No results for input(s): PHART, IPC3BGX, PO2ART, CTC6GOX, BEART, THGBART, P7RCXQUI, FGB4GMD in the last 72 hours.     INR:   Recent Labs      09/29/18   0535  09/30/18   0535  10/01/18   0443   INR  0.97  0.92  0.95     Lactate: No results for input(s): LACTATE in the last 72 hours.  Cultures:  -----------------------------------------------------------------        Assessment/Plan:   Pt is a 69 y.o. with a PMH of     #      #      #        Case will be discussed with attending intensivist Dr. Winsome Arcos MD   R2, Pager [phone]

## 2018-10-01 NOTE — PROGRESS NOTES
escelated since August of this year. Will get MRI of the brain and EEG. Depending on results, might pursue a rapidly progressive dementia workup more agressively. He does have some frontal release signs that make me suspect perhaps this is an acute exacerbation of an underlying neuro-degenerative process. However, reports are that it has been more rapid but he lives alone, so it is hard to tell. Has pretty heavy drinking history, so Wernicke's encephalopathy is not entirely off the table.      Plan:  -Routine EEG  -MRI brain w/wo contrast   -Delirium Precautions: Maintain a regular night-day/sleep-wake cycle: discourage daytime naps, re-orient frequently, shades up during the daytime, family at bedside as often as possible, minimize nighttime awakenings, , utilize relaxation channel on television       Morelia Vuong, Jaya Hernandez

## 2018-10-01 NOTE — PLAN OF CARE
Problem: Falls - Risk of:  Goal: Will remain free from falls  Will remain free from falls     Outcome: Ongoing  Fall precautions are in place. Bed alarm is on and in lowest position. Pt has avasys monitor and restraints in place. Will continue to monitor. Problem: Injury - Risk of, Physical Injury:  Goal: Will remain free from falls  Will remain free from falls     Outcome: Ongoing  Fall precautions are in place. Bed alarm is on and in lowest position. Pt has avasys monitor and restraints in place. Will continue to monitor. Problem: Restraint Use - Nonviolent/Non-Self-Destructive Behavior:  Goal: Absence of restraint indications  Absence of restraint indications   Outcome: Ongoing  Pt pulled out corpak and placed in restraints. See restraints on flow sheets.

## 2018-10-02 LAB
ALBUMIN SERPL-MCNC: 2.2 G/DL (ref 3.4–5)
ANION GAP SERPL CALCULATED.3IONS-SCNC: 10 MMOL/L (ref 3–16)
BASOPHILS ABSOLUTE: 0 K/UL (ref 0–0.2)
BASOPHILS RELATIVE PERCENT: 0.6 %
BUN BLDV-MCNC: 7 MG/DL (ref 7–20)
CALCIUM SERPL-MCNC: 8.1 MG/DL (ref 8.3–10.6)
CHLORIDE BLD-SCNC: 104 MMOL/L (ref 99–110)
CO2: 26 MMOL/L (ref 21–32)
CREAT SERPL-MCNC: 0.7 MG/DL (ref 0.8–1.3)
EOSINOPHILS ABSOLUTE: 0.1 K/UL (ref 0–0.6)
EOSINOPHILS RELATIVE PERCENT: 1.9 %
GFR AFRICAN AMERICAN: >60
GFR NON-AFRICAN AMERICAN: >60
GLUCOSE BLD-MCNC: 100 MG/DL (ref 70–99)
GLUCOSE BLD-MCNC: 85 MG/DL (ref 70–99)
GLUCOSE BLD-MCNC: 89 MG/DL (ref 70–99)
GLUCOSE BLD-MCNC: 92 MG/DL (ref 70–99)
GLUCOSE BLD-MCNC: 92 MG/DL (ref 70–99)
GLUCOSE BLD-MCNC: 93 MG/DL (ref 70–99)
HCT VFR BLD CALC: 35.4 % (ref 40.5–52.5)
HEMOGLOBIN: 12.1 G/DL (ref 13.5–17.5)
INR BLD: 0.98 (ref 0.86–1.14)
LYMPHOCYTES ABSOLUTE: 0.4 K/UL (ref 1–5.1)
LYMPHOCYTES RELATIVE PERCENT: 6.1 %
MAGNESIUM: 2 MG/DL (ref 1.8–2.4)
MCH RBC QN AUTO: 35.7 PG (ref 26–34)
MCHC RBC AUTO-ENTMCNC: 34.3 G/DL (ref 31–36)
MCV RBC AUTO: 104.1 FL (ref 80–100)
MONOCYTES ABSOLUTE: 0.7 K/UL (ref 0–1.3)
MONOCYTES RELATIVE PERCENT: 10.9 %
NEUTROPHILS ABSOLUTE: 5.5 K/UL (ref 1.7–7.7)
NEUTROPHILS RELATIVE PERCENT: 80.5 %
PDW BLD-RTO: 13.7 % (ref 12.4–15.4)
PERFORMED ON: NORMAL
PHOSPHORUS: 2.4 MG/DL (ref 2.5–4.9)
PLATELET # BLD: 239 K/UL (ref 135–450)
PMV BLD AUTO: 8.1 FL (ref 5–10.5)
POTASSIUM SERPL-SCNC: 3.7 MMOL/L (ref 3.5–5.1)
PROTHROMBIN TIME: 11.2 SEC (ref 9.8–13)
RBC # BLD: 3.4 M/UL (ref 4.2–5.9)
SODIUM BLD-SCNC: 140 MMOL/L (ref 136–145)
WBC # BLD: 6.8 K/UL (ref 4–11)

## 2018-10-02 PROCEDURE — 36415 COLL VENOUS BLD VENIPUNCTURE: CPT

## 2018-10-02 PROCEDURE — 92526 ORAL FUNCTION THERAPY: CPT

## 2018-10-02 PROCEDURE — 6360000002 HC RX W HCPCS: Performed by: INTERNAL MEDICINE

## 2018-10-02 PROCEDURE — 85025 COMPLETE CBC W/AUTO DIFF WBC: CPT

## 2018-10-02 PROCEDURE — 6360000002 HC RX W HCPCS: Performed by: STUDENT IN AN ORGANIZED HEALTH CARE EDUCATION/TRAINING PROGRAM

## 2018-10-02 PROCEDURE — 1200000000 HC SEMI PRIVATE

## 2018-10-02 PROCEDURE — 83735 ASSAY OF MAGNESIUM: CPT

## 2018-10-02 PROCEDURE — 2580000003 HC RX 258: Performed by: INTERNAL MEDICINE

## 2018-10-02 PROCEDURE — C9113 INJ PANTOPRAZOLE SODIUM, VIA: HCPCS | Performed by: STUDENT IN AN ORGANIZED HEALTH CARE EDUCATION/TRAINING PROGRAM

## 2018-10-02 PROCEDURE — 94761 N-INVAS EAR/PLS OXIMETRY MLT: CPT

## 2018-10-02 PROCEDURE — 85610 PROTHROMBIN TIME: CPT

## 2018-10-02 PROCEDURE — 2700000000 HC OXYGEN THERAPY PER DAY

## 2018-10-02 PROCEDURE — 6370000000 HC RX 637 (ALT 250 FOR IP): Performed by: STUDENT IN AN ORGANIZED HEALTH CARE EDUCATION/TRAINING PROGRAM

## 2018-10-02 PROCEDURE — 6370000000 HC RX 637 (ALT 250 FOR IP): Performed by: INTERNAL MEDICINE

## 2018-10-02 PROCEDURE — 80069 RENAL FUNCTION PANEL: CPT

## 2018-10-02 PROCEDURE — 93005 ELECTROCARDIOGRAM TRACING: CPT | Performed by: STUDENT IN AN ORGANIZED HEALTH CARE EDUCATION/TRAINING PROGRAM

## 2018-10-02 RX ORDER — QUETIAPINE FUMARATE 25 MG/1
25 TABLET, FILM COATED ORAL 2 TIMES DAILY
Status: DISCONTINUED | OUTPATIENT
Start: 2018-10-02 | End: 2018-10-03

## 2018-10-02 RX ADMIN — HEPARIN SODIUM 5000 UNITS: 5000 INJECTION INTRAVENOUS; SUBCUTANEOUS at 06:37

## 2018-10-02 RX ADMIN — PANTOPRAZOLE SODIUM 40 MG: 40 INJECTION, POWDER, FOR SOLUTION INTRAVENOUS at 07:29

## 2018-10-02 RX ADMIN — THIAMINE HYDROCHLORIDE 500 MG: 100 INJECTION, SOLUTION INTRAMUSCULAR; INTRAVENOUS at 13:48

## 2018-10-02 RX ADMIN — DEXTROSE AND SODIUM CHLORIDE: 5; 450 INJECTION, SOLUTION INTRAVENOUS at 15:25

## 2018-10-02 RX ADMIN — HEPARIN SODIUM 5000 UNITS: 5000 INJECTION INTRAVENOUS; SUBCUTANEOUS at 20:42

## 2018-10-02 RX ADMIN — HEPARIN SODIUM 5000 UNITS: 5000 INJECTION INTRAVENOUS; SUBCUTANEOUS at 13:48

## 2018-10-02 RX ADMIN — QUETIAPINE FUMARATE 25 MG: 25 TABLET ORAL at 20:42

## 2018-10-02 ASSESSMENT — PAIN SCALES - PAIN ASSESSMENT IN ADVANCED DEMENTIA (PAINAD)
TOTALSCORE: 0
BODYLANGUAGE: 0
BREATHING: 0
NEGVOCALIZATION: 0
BREATHING: 0
FACIALEXPRESSION: 0
FACIALEXPRESSION: 0
CONSOLABILITY: 0
BODYLANGUAGE: 0
NEGVOCALIZATION: 0
NEGVOCALIZATION: 0
CONSOLABILITY: 0
TOTALSCORE: 0
CONSOLABILITY: 0
TOTALSCORE: 0
BREATHING: 0
BODYLANGUAGE: 0
FACIALEXPRESSION: 0

## 2018-10-02 ASSESSMENT — PAIN SCALES - GENERAL
PAINLEVEL_OUTOF10: 0

## 2018-10-02 NOTE — PROGRESS NOTES
Speech Language Pathology  Treatment attempt    Chart reviewed. Attempted to see pt 2x this morning to address dysphagia and to determine if pt could participate in a Modified Barium Swallow, but pt not alert enough to participate. Will attempt again later as time permits. Discussed with RN, Chepe Alanis.     Aurelio Naik [facility]  Speech-Language Pathologist  Pager [phone]

## 2018-10-02 NOTE — PROGRESS NOTES
Hospitalist Progress Note      PCP: Radha Maker    Date of Admission: 9/24/2018    Chief Complaint:  Found down, [place] [facility] Course:  81-year-old male presented with being found down, altered mental status patient has been admitted for further workup. Subjective:   Patient is sleepy. Cannot provide any review of the system . Medications:  Reviewed    Infusion Medications    dextrose 5 % and 0.45 % NaCl 50 mL/hr at 10/01/18 1923    dextrose       Scheduled Medications    thiamine (VITAMIN B1) IVPB  500 mg Intravenous Q24H    insulin lispro  0-6 Units Subcutaneous TID WC    insulin lispro  0-3 Units Subcutaneous Nightly    pantoprazole  40 mg Intravenous Daily    nicotine  1 patch Transdermal Daily    sodium chloride flush  10 mL Intravenous 2 times per day    sodium chloride flush  10 mL Intravenous 2 times per day    heparin (porcine)  5,000 Units Subcutaneous 3 times per day     PRN Meds: sodium chloride flush, magnesium hydroxide, sodium chloride flush, glucose, dextrose, glucagon (rDNA), dextrose      Intake/Output Summary (Last 24 hours) at 10/02/18 1207  Last data filed at 10/02/18 1120   Gross per 24 hour   Intake             1474 ml   Output              675 ml   Net              799 ml       Physical Exam Performed:    BP (!) 153/96   Pulse 107   Temp 96.9 °F (36.1 °C) (Axillary)   Resp 16   Ht 5' 8\" (1.727 m)   Wt 132 lb 0.9 oz (59.9 kg)   SpO2 95%   BMI 20.08 kg/m²     General appearance: No apparent distress, appears stated age   HEENT: Pupils equal, round, and reactive to light. Conjunctivae/corneas clear. Nystagmus  present  Respiratory:  Normal respiratory effort. Clear to auscultation, bilaterally without Rales/Wheezes/Rhonchi. Cardiovascular: Tachycardia, Regular  rhythm with normal S1/S2 without murmurs, rubs or gallops. Abdomen: Soft, non-tender, non-distended with normal bowel sounds.   Neurologic:  Disoriented, moving all 4 extremities, nystagmus present  Psychiatric: Disoriented, lethargic     Labs:   Recent Labs      09/30/18   0535  10/01/18   0443  10/02/18   0611   WBC  6.1  7.5  6.8   HGB  13.5  12.6*  12.1*   HCT  40.5  37.1*  35.4*   PLT  195  212  239     Recent Labs      09/30/18   0535  10/01/18   0443  10/02/18   0611   NA  139  139  140   K  3.9  3.5  3.7   CL  102  103  104   CO2  26  28  26   BUN  7  7  7   CREATININE  0.7*  0.7*  0.7*   CALCIUM  7.7*  7.7*  8.1*   PHOS  2.9  2.5  2.4*     No results for input(s): AST, ALT, BILIDIR, BILITOT, ALKPHOS in the last 72 hours. Recent Labs      09/30/18   0535  10/01/18   0443  10/02/18   0611   INR  0.92  0.95  0.98     No results for input(s): CKTOTAL, TROPONINI in the last 72 hours. Urinalysis:      Lab Results   Component Value Date    NITRU Negative 09/24/2018    WBCUA 0-2 09/24/2018    RBCUA 3-5 09/24/2018    BLOODU LARGE 09/24/2018    SPECGRAV 1.025 09/24/2018    GLUCOSEU 4.0 09/25/2018       Radiology:  XR ABDOMEN (KUB) (SINGLE AP VIEW)   Final Result   See above      XR ABDOMEN (KUB) (SINGLE AP VIEW)   Final Result   See above      XR CHEST STANDARD (2 VW)   Final Result      Increasing size of effusions with bibasilar atelectasis. Questionable tip of feeding tube at gastroesophageal junction. Single view abdomen recommended      Findings called to patient's floor nurse, Reinaldo Liz, on 9/30/2018 at 9:17 AM                  XR ABDOMEN (KUB) (SINGLE AP VIEW)   Final Result   Gastric tube coiled in the cardia of the stomach          XR ABDOMEN (KUB) (SINGLE AP VIEW)   Final Result      XR ABDOMEN (KUB) (SINGLE AP VIEW)   Final Result      A Corpak feeding tube is in place with the tip at the level of the gastroesophageal junction. Advancement is recommended. XR CHEST STANDARD (2 VW)   Final Result      Interval development of moderately large right lower lung pleural parenchymal opacity      CT ABDOMEN PELVIS W IV CONTRAST Additional Contrast? None   Final Result   1.

## 2018-10-02 NOTE — PROGRESS NOTES
PO trials due to probable overt signs of aspiration above, as well as lethargy. Pt would not be appropriate for MBS at this time due to lethargy  Cont goal  10/1-  Pt alert but very confused- attempting to get out of restraints/get out of bed, constantly talking about need to remove \"this tape\" (re: restraints) and other nonsensical comments. Pt analyzed with ice chips, applesauce, cracker, thin water and nectar thick water by straw. With all trials, pt required cues to inhibit talking while food was in his mouth. Pt with poor oral control and then coughing following trial of water. Pt coughed with cracker- likely due to inability to inhibit talking while the cracker was in his mouth. No s/s aspiration with pureed or nectar thick liquids by straw. Recommend pureed with nectar thick liquids with 1:1 assistance with all meals- Make NPO if  if s/s aspiration emerge and alert SLP. con't goal   10/2-  Attempted to see pt several time this morning, but pt too lethargic to participate. Currently, pt slightly more awake. Sitting upright with his head down and tilted to the right- unable to keep head at midline. Pt analyzed with 2 ice chips- mastication with first was very slow with first and very minimal with the second- unable to visualize or adequately palpate anterior neck due to head positioning, but pt likely did not produce a swallow with the second trial. Because of this, did not present with further trials due to concern for pt safety. Pt was not given breakfast this morning due to lethargy. Pt not able to participate in MBS this date due to unable to adequately participate. con't goal       Patient/Family/Caregiver Education:  9/28-pt educated to purpose of visit, does not demonstrate full comprehension  10/1- pt educated to purpose of the visit and diet recommendation- pt unable to engage in education due to confusion- did not demonstrate comprehension.    10/1- pt educated to purpose of the visit- pt with no

## 2018-10-02 NOTE — PROGRESS NOTES
Internal Medicine PGY-1 Resident Progress Note        PCP: Madelyn Falk    Date of Admission: 9/24/2018    Chief Complaint: Altered mental status     Subjective: Kuldeep Ordonezkshire. High-dose IV thiamine started yesterday; Seroquel not started due to prolonged QTc (456 9/25/18; 438 9/27/18; nl 360-440)    Very lethargic and unwilling to answer ROS this am.    Hb 12.1 (10/1) down from 14.4 on admission (9/24)    Per Neuro:   - routine EEG  - MRI brain w/wo contrast rapidly progressive dementia vs exacerbation of underlying neuro-degenerative process vs Wernicke's encephalopathy  - Delirium precautions    Per Case Management: [facility] Elysia Ruffin will accept today (10/2) if out of restraints and sitter free for 24 hrs. Infusion Medications    dextrose 5 % and 0.45 % NaCl 50 mL/hr at 10/01/18 1923    dextrose       Scheduled Medications    thiamine (VITAMIN B1) IVPB  500 mg Intravenous Q24H    insulin lispro  0-6 Units Subcutaneous TID WC    insulin lispro  0-3 Units Subcutaneous Nightly    pantoprazole  40 mg Intravenous Daily    nicotine  1 patch Transdermal Daily    sodium chloride flush  10 mL Intravenous 2 times per day    sodium chloride flush  10 mL Intravenous 2 times per day    heparin (porcine)  5,000 Units Subcutaneous 3 times per day     PRN Meds: sodium chloride flush, magnesium hydroxide, sodium chloride flush, glucose, dextrose, glucagon (rDNA), dextrose      Intake/Output Summary (Last 24 hours) at 10/02/18 0802  Last data filed at 10/02/18 0347   Gross per 24 hour   Intake             3453 ml   Output              675 ml   Net             2778 ml       Physical Exam Performed:    BP (!) 156/94   Pulse 110   Temp 98 °F (36.7 °C) (Oral)   Resp 16   Ht 5' 8\" (1.727 m)   Wt 132 lb 0.9 oz (59.9 kg)   SpO2 96%   BMI 20.08 kg/m²     General appearance: Chronically ill appearing male. HEENT:  EOMI; horizontal nystagmus  Neck: Supple  Respiratory:  Normal respiratory effort.  Clear to auscultation,

## 2018-10-02 NOTE — CARE COORDINATION
CM continues to follow for discharge planning. Noted patient back out of restraints as of this AM. Patient able to transfer to SNF at [address] [place] [address] when ready for discharge and out of restraints x24 hours and free from sitter.     Thank you,  Jess Sorenson RN,   4th Floor Progressive Care Unit  [phone]

## 2018-10-03 VITALS
BODY MASS INDEX: 20.01 KG/M2 | DIASTOLIC BLOOD PRESSURE: 81 MMHG | HEART RATE: 108 BPM | RESPIRATION RATE: 16 BRPM | HEIGHT: 68 IN | TEMPERATURE: 98 F | OXYGEN SATURATION: 94 % | WEIGHT: 132.06 LBS | SYSTOLIC BLOOD PRESSURE: 127 MMHG

## 2018-10-03 LAB
ALBUMIN SERPL-MCNC: 1.7 G/DL (ref 3.4–5)
ANION GAP SERPL CALCULATED.3IONS-SCNC: 10 MMOL/L (ref 3–16)
BASOPHILS ABSOLUTE: 0 K/UL (ref 0–0.2)
BASOPHILS RELATIVE PERCENT: 0.2 %
BUN BLDV-MCNC: 5 MG/DL (ref 7–20)
CALCIUM SERPL-MCNC: 7.7 MG/DL (ref 8.3–10.6)
CHLORIDE BLD-SCNC: 105 MMOL/L (ref 99–110)
CO2: 25 MMOL/L (ref 21–32)
CREAT SERPL-MCNC: 0.8 MG/DL (ref 0.8–1.3)
EKG ATRIAL RATE: 110 BPM
EKG DIAGNOSIS: NORMAL
EKG P AXIS: 62 DEGREES
EKG P-R INTERVAL: 136 MS
EKG Q-T INTERVAL: 316 MS
EKG QRS DURATION: 62 MS
EKG QTC CALCULATION (BAZETT): 427 MS
EKG R AXIS: 56 DEGREES
EKG T AXIS: 56 DEGREES
EKG VENTRICULAR RATE: 110 BPM
EOSINOPHILS ABSOLUTE: 0.1 K/UL (ref 0–0.6)
EOSINOPHILS RELATIVE PERCENT: 1.1 %
GFR AFRICAN AMERICAN: >60
GFR NON-AFRICAN AMERICAN: >60
GLUCOSE BLD-MCNC: 105 MG/DL (ref 70–99)
GLUCOSE BLD-MCNC: 111 MG/DL (ref 70–99)
GLUCOSE BLD-MCNC: 113 MG/DL (ref 70–99)
GLUCOSE BLD-MCNC: 114 MG/DL (ref 70–99)
GLUCOSE BLD-MCNC: 158 MG/DL (ref 70–99)
HCT VFR BLD CALC: 33.8 % (ref 40.5–52.5)
HEMOGLOBIN: 11.6 G/DL (ref 13.5–17.5)
INR BLD: 0.99 (ref 0.86–1.14)
LYMPHOCYTES ABSOLUTE: 0.5 K/UL (ref 1–5.1)
LYMPHOCYTES RELATIVE PERCENT: 6.8 %
MAGNESIUM: 1.6 MG/DL (ref 1.8–2.4)
MCH RBC QN AUTO: 36.4 PG (ref 26–34)
MCHC RBC AUTO-ENTMCNC: 34.3 G/DL (ref 31–36)
MCV RBC AUTO: 106.1 FL (ref 80–100)
MONOCYTES ABSOLUTE: 0.7 K/UL (ref 0–1.3)
MONOCYTES RELATIVE PERCENT: 10 %
NEUTROPHILS ABSOLUTE: 5.8 K/UL (ref 1.7–7.7)
NEUTROPHILS RELATIVE PERCENT: 81.9 %
PDW BLD-RTO: 14.2 % (ref 12.4–15.4)
PERFORMED ON: ABNORMAL
PHOSPHORUS: 2.8 MG/DL (ref 2.5–4.9)
PLATELET # BLD: 213 K/UL (ref 135–450)
PMV BLD AUTO: 8.2 FL (ref 5–10.5)
POTASSIUM SERPL-SCNC: 3.3 MMOL/L (ref 3.5–5.1)
PROTHROMBIN TIME: 11.3 SEC (ref 9.8–13)
RBC # BLD: 3.19 M/UL (ref 4.2–5.9)
SODIUM BLD-SCNC: 140 MMOL/L (ref 136–145)
WBC # BLD: 7.1 K/UL (ref 4–11)

## 2018-10-03 PROCEDURE — 85025 COMPLETE CBC W/AUTO DIFF WBC: CPT

## 2018-10-03 PROCEDURE — 97530 THERAPEUTIC ACTIVITIES: CPT

## 2018-10-03 PROCEDURE — 2580000003 HC RX 258: Performed by: INTERNAL MEDICINE

## 2018-10-03 PROCEDURE — 6370000000 HC RX 637 (ALT 250 FOR IP): Performed by: STUDENT IN AN ORGANIZED HEALTH CARE EDUCATION/TRAINING PROGRAM

## 2018-10-03 PROCEDURE — 85610 PROTHROMBIN TIME: CPT

## 2018-10-03 PROCEDURE — 2580000003 HC RX 258: Performed by: FAMILY MEDICINE

## 2018-10-03 PROCEDURE — 92526 ORAL FUNCTION THERAPY: CPT

## 2018-10-03 PROCEDURE — 6370000000 HC RX 637 (ALT 250 FOR IP): Performed by: INTERNAL MEDICINE

## 2018-10-03 PROCEDURE — 93010 ELECTROCARDIOGRAM REPORT: CPT | Performed by: INTERNAL MEDICINE

## 2018-10-03 PROCEDURE — 6360000002 HC RX W HCPCS: Performed by: STUDENT IN AN ORGANIZED HEALTH CARE EDUCATION/TRAINING PROGRAM

## 2018-10-03 PROCEDURE — 36415 COLL VENOUS BLD VENIPUNCTURE: CPT

## 2018-10-03 PROCEDURE — 83735 ASSAY OF MAGNESIUM: CPT

## 2018-10-03 PROCEDURE — 80069 RENAL FUNCTION PANEL: CPT

## 2018-10-03 PROCEDURE — C9113 INJ PANTOPRAZOLE SODIUM, VIA: HCPCS | Performed by: STUDENT IN AN ORGANIZED HEALTH CARE EDUCATION/TRAINING PROGRAM

## 2018-10-03 RX ORDER — POTASSIUM CHLORIDE 20 MEQ/1
40 TABLET, EXTENDED RELEASE ORAL ONCE
Status: COMPLETED | OUTPATIENT
Start: 2018-10-03 | End: 2018-10-03

## 2018-10-03 RX ORDER — THIAMINE MONONITRATE (VIT B1) 100 MG
100 TABLET ORAL DAILY
Qty: 30 TABLET | Refills: 3 | Status: SHIPPED | OUTPATIENT
Start: 2018-10-03

## 2018-10-03 RX ORDER — QUETIAPINE FUMARATE 25 MG/1
25 TABLET, FILM COATED ORAL NIGHTLY
Qty: 60 TABLET | Refills: 3 | Status: SHIPPED | OUTPATIENT
Start: 2018-10-04

## 2018-10-03 RX ORDER — QUETIAPINE FUMARATE 25 MG/1
25 TABLET, FILM COATED ORAL NIGHTLY
Status: DISCONTINUED | OUTPATIENT
Start: 2018-10-04 | End: 2018-10-03 | Stop reason: HOSPADM

## 2018-10-03 RX ORDER — POTASSIUM CHLORIDE 1.5 G/1.77G
40 POWDER, FOR SOLUTION ORAL ONCE
Status: DISCONTINUED | OUTPATIENT
Start: 2018-10-03 | End: 2018-10-03

## 2018-10-03 RX ORDER — MAGNESIUM SULFATE IN WATER 40 MG/ML
2 INJECTION, SOLUTION INTRAVENOUS ONCE
Status: COMPLETED | OUTPATIENT
Start: 2018-10-03 | End: 2018-10-03

## 2018-10-03 RX ADMIN — INSULIN LISPRO 1 UNITS: 100 INJECTION, SOLUTION INTRAVENOUS; SUBCUTANEOUS at 12:37

## 2018-10-03 RX ADMIN — PANTOPRAZOLE SODIUM 40 MG: 40 INJECTION, POWDER, FOR SOLUTION INTRAVENOUS at 08:44

## 2018-10-03 RX ADMIN — Medication 10 ML: at 08:52

## 2018-10-03 RX ADMIN — POTASSIUM CHLORIDE 40 MEQ: 20 TABLET, EXTENDED RELEASE ORAL at 09:03

## 2018-10-03 RX ADMIN — DEXTROSE AND SODIUM CHLORIDE: 5; 450 INJECTION, SOLUTION INTRAVENOUS at 11:39

## 2018-10-03 RX ADMIN — QUETIAPINE FUMARATE 25 MG: 25 TABLET ORAL at 08:44

## 2018-10-03 RX ADMIN — MAGNESIUM SULFATE HEPTAHYDRATE 2 G: 40 INJECTION, SOLUTION INTRAVENOUS at 08:46

## 2018-10-03 RX ADMIN — HEPARIN SODIUM 5000 UNITS: 5000 INJECTION INTRAVENOUS; SUBCUTANEOUS at 06:02

## 2018-10-03 ASSESSMENT — PAIN SCALES - PAIN ASSESSMENT IN ADVANCED DEMENTIA (PAINAD)
TOTALSCORE: 0
NEGVOCALIZATION: 0
TOTALSCORE: 0
TOTALSCORE: 0
CONSOLABILITY: 0
BODYLANGUAGE: 0
BREATHING: 0
BREATHING: 0
FACIALEXPRESSION: 0
BREATHING: 0
CONSOLABILITY: 0
BREATHING: 0
NEGVOCALIZATION: 0
CONSOLABILITY: 0
FACIALEXPRESSION: 0
BODYLANGUAGE: 0
CONSOLABILITY: 0
TOTALSCORE: 0
CONSOLABILITY: 0
BREATHING: 0
FACIALEXPRESSION: 0
BODYLANGUAGE: 0
FACIALEXPRESSION: 0
BODYLANGUAGE: 0
NEGVOCALIZATION: 0
FACIALEXPRESSION: 0
BODYLANGUAGE: 0
TOTALSCORE: 0

## 2018-10-03 ASSESSMENT — PAIN SCALES - GENERAL
PAINLEVEL_OUTOF10: 0

## 2018-10-03 NOTE — PLAN OF CARE
Problem: Falls - Risk of:  Goal: Will remain free from falls  Will remain free from falls     Outcome: Ongoing  Pt is alert and oriented to person only. No attempts to get up on own. Bed alarm on, call light within reach, avasys in place. Will continue to monitor. Problem: Injury - Risk of, Physical Injury:  Goal: Will remain free from falls  Will remain free from falls     Outcome: Ongoing  Pt is alert and oriented to person only. No attempts to get up on own. Bed alarm on, call light within reach, avasys in place. Will continue to monitor. Problem: Skin Integrity:  Goal: Will show no infection signs and symptoms  Will show no infection signs and symptoms   Outcome: Ongoing  Pt has redness/excoriation to may area. Turning and changing pt q2h, aloe vesta applied. Mayco cartwright'aby, brief in place. Heels elevated on pillow. Will continue to monitor and report changes.

## 2018-10-03 NOTE — PROGRESS NOTES
lethargy. Pt would not be appropriate for MBS at this time due to lethargy  Cont goal  10/1-  Pt alert but very confused- attempting to get out of restraints/get out of bed, constantly talking about need to remove \"this tape\" (re: restraints) and other nonsensical comments. Pt analyzed with ice chips, applesauce, cracker, thin water and nectar thick water by straw. With all trials, pt required cues to inhibit talking while food was in his mouth. Pt with poor oral control and then coughing following trial of water. Pt coughed with cracker- likely due to inability to inhibit talking while the cracker was in his mouth. No s/s aspiration with pureed or nectar thick liquids by straw. Recommend pureed with nectar thick liquids with 1:1 assistance with all meals- Make NPO if  if s/s aspiration emerge and alert SLP. con't goal   10/2-  Attempted to see pt several time this morning, but pt too lethargic to participate. Currently, pt slightly more awake. Sitting upright with his head down and tilted to the right- unable to keep head at midline. Pt analyzed with 2 ice chips- mastication with first was very slow with first and very minimal with the second- unable to visualize or adequately palpate anterior neck due to head positioning, but pt likely did not produce a swallow with the second trial. Because of this, did not present with further trials due to concern for pt safety. Pt was not given breakfast this morning due to lethargy. Pt not able to participate in MBS this date due to unable to adequately participate. con't goal   10/3: pt responding inconsistently to verbalizations, though kept eyes closed throughout session. Pt did accept puree and nectar thick items that are at bedside on tray. No throat clearing, coughing or change in voice occurred. No oral residue noted. Did not assess solids due to lethargy. No evidence of respiratory decline per chart review.   MD note states, \" Respiratory:  Normal respiratory

## 2018-10-03 NOTE — PROGRESS NOTES
Physical Therapy  Facility/Department: [facility] [address] [facility]  Daily Treatment Note  NAME: Dania Saleh     : 1940  MRN: [id number]    Date of Service: 10/3/2018    Discharge Recommendations:Wyatt Holcomb scored a /24 on the AM-PAC short mobility form. Current research shows that an AM-PAC score of 17 or less is typically not associated with a discharge to the patient's home setting. Based on the patients AM-PAC score and their current functional mobility deficits, it is recommended that the patient have 3-5 sessions per week of Physical Therapy at d/c to increase the patients independence. PT Equipment Recommendations  Equipment Needed:  (defer)    Patient Diagnosis(es): The encounter diagnosis was Altered mental status, unspecified altered mental status type. has no past medical history on file. has no past surgical history on file. Restrictions  Position Activity Restriction  Other position/activity restrictions: no activity orders noted, seizure precautions     Subjective   General  Chart Reviewed: Yes  Additional Pertinent Hx: Pt presents  by EMS after being found down and with AMS. Hx of dementia and alcohol abuse. Head CT \"shows no acute concern\" CT Abdomen/pelvis/chest \"Small right pleural effusion\" XR chest \"No acute pulmonary disease\"  Family / Caregiver Present: No  Subjective  Subjective: Pt found supine in bed and agreeable to PT with max encouragment. \" I'm just too tired today-I need to go home and go to sleep. \"  Pain Screening  Patient Currently in Pain: Denies          Orientation  Orientation  Overall Orientation Status: Impaired (oriented to name only)     Objective   Bed mobility  Supine to Sit: Dependent/Total (dependent x2 with max encouragement)  Sit to Supine: Dependent/Total (dependent x2 with max encouragement)  Scooting: Dependent/Total (assist x2)     Transfers  Sit to Stand: Dependent/Total (max assist x2 with max vc; pt pulling up on walker from raised ht bed; x2

## 2018-10-03 NOTE — PROGRESS NOTES
Occupational Therapy  Facility/Department: [facility] [address] [facility]  Daily Treatment Note  NAME: Manohar Lowe  : 1940  MRN: [id number]    Date of Service: 10/3/2018    Discharge Recommendations: Manohar Lowe scored a  on the AM-PAC ADL Inpatient form. Current research shows that an AM-PAC score of 17 or less is typically not associated with a discharge to the patient's home setting. Based on the patients AM-PAC score and their current ADL deficits, it is recommended that the patient have 3-5 sessions per week of Occupational Therapy at d/c to increase the patients independence. OT Equipment Recommendations  Equipment Needed: No  Other: defer recommendations to next level of care    Patient Diagnosis(es): The encounter diagnosis was Altered mental status, unspecified altered mental status type. has no past medical history on file. has no past surgical history on file. Restrictions  Position Activity Restriction  Other position/activity restrictions: no activity orders noted, seizure precautions  Subjective   General  Chart Reviewed: Yes  Additional Pertinent Hx: Pt admitted 18 after being found down at home. Hospital Course:  CT Head: neg; CT Chest: Small right pleural effusion and Bilateral upper lobe nodules require follow up; has new oxygen requirement overnight and being treated for CI bleed along with sepsis with mild rectosigmoid colitis. PMH includes: Gout, HTN  Family / Caregiver Present: No  Referring Practitioner: Dr. Anthony Aly  Diagnosis: Sepsis  Subjective  Subjective: Pt supine in bed upon entry; agreeable to OT w/ max encouragement. \"I just want to go home and go to sleep. \" Pt confused with conversation, very lethargic throughout session. Orientation  Orientation  Overall Orientation Status: Impaired  Orientation Level: Oriented to person (ONLY)     Objective     Treatment included functional transfer training, ADLs, and patient education. ADL  Feeding:  Thickened

## 2018-10-03 NOTE — PROGRESS NOTES
Internal Medicine PGY-1 Resident Progress Note        PCP: Griffin Anderson    Date of Admission: 9/24/2018    Chief Complaint: Altered mental status     Subjective: Berry Ridley. Started Seroquel and d/c synthroid yesterday; continuing high-dose IV thiamine    Continues to be lethargic and unwilling to answer ROS this am.    Has not had a sitter or restraints since 10 am yesterday. Hb 11.6 up from 12.1 (10/1) and down from 14.4 on admission (9/24)  K 3.3 (replenished)  Ca 7.7 (stable)    Per Neuro:   - EEG (10/2): The EEG was abnormal due to the presence of:  1) moderate Generalized slowing which is a non-specific finding consistent with a generalized disturbance of cerebral functioning including toxic, metabolic, or structural abnormalities that are multi-focal or diffuse.   2) intermittent right frontocentral Focal slowing which is consistent with a focal disturbance of cerebral function and an underlying structural lesion should be considered. Movement artifact as etiology for this can't be excluded  - Clinical correlation is recommended. The absence of epileptiform discharges on a single EEG does not rule out a diagnosis of epilepsy or rule out non-convulsive or complex partial status epilepticus as a cause of altered mental status    - MRI brain w/wo contrast rapidly progressive dementia vs exacerbation of underlying neuro-degenerative process vs Wernicke's encephalopathy  - Delirium precautions    Per Case Management: [facility] will accept today (10/2) if out of restraints and sitter free for 24 hrs.     Infusion Medications    dextrose 5 % and 0.45 % NaCl 50 mL/hr at 10/02/18 1525    dextrose       Scheduled Medications    QUEtiapine  25 mg Oral BID    thiamine (VITAMIN B1) IVPB  500 mg Intravenous Q24H    insulin lispro  0-6 Units Subcutaneous TID WC    insulin lispro  0-3 Units Subcutaneous Nightly    pantoprazole  40 mg Intravenous Daily    nicotine  1 patch Transdermal Daily    sodium 09/24/2018    RBCUA 3-5 09/24/2018    BLOODU LARGE 09/24/2018    SPECGRAV 1.025 09/24/2018    GLUCOSEU 4.0 09/25/2018       Radiology:  XR ABDOMEN (KUB) (SINGLE AP VIEW)   Final Result   See above      XR ABDOMEN (KUB) (SINGLE AP VIEW)   Final Result   See above      XR CHEST STANDARD (2 VW)   Final Result      Increasing size of effusions with bibasilar atelectasis. Questionable tip of feeding tube at gastroesophageal junction. Single view abdomen recommended      Findings called to patient's floor nurse, Daniel Manzano, on 9/30/2018 at 9:17 AM                  XR ABDOMEN (KUB) (SINGLE AP VIEW)   Final Result   Gastric tube coiled in the cardia of the stomach          XR ABDOMEN (KUB) (SINGLE AP VIEW)   Final Result      XR ABDOMEN (KUB) (SINGLE AP VIEW)   Final Result      A [place] feeding tube is in place with the tip at the level of the gastroesophageal junction. Advancement is recommended. XR CHEST STANDARD (2 VW)   Final Result      Interval development of moderately large right lower lung pleural parenchymal opacity      CT ABDOMEN PELVIS W IV CONTRAST Additional Contrast? None   Final Result   1. Possible mild rectosigmoid colitis. 2.  Cholelithiasis with gallbladder distention. Correlate with    ultrasound. CT CHEST W CONTRAST   Final Result     Small right pleural effusion. Bilateral upper lobe nodules require    follow-up. CT HEAD WO CONTRAST   Final Result      1. No acute intracranial process. Low-density fluid collection in the scalp may represent a chronic hematoma versus cellulitis and edema. 2. Mild cerebral atrophy. 3. Mild chronic small vessel ischemic disease. XR CHEST PORTABLE   Final Result      No acute pulmonary disease. MRI BRAIN W WO CONTRAST    (Results Pending)       Assessment/Plan:    Vignesh Hooker a 69 y. o. male with PMH of HTN, gout, and suspected alcoholism who presents to ED from home via EMS after being found down at home for unknown length of time. Found to have AG metabolic acidosis and leukocytosis and be septic.      Acute metabolic encephalopathy: unsure of baseline? Other considerations include infection, wernicke's encephalopathy in setting of chronic alcoholism. Given rally pack on admission.    - continue thiamine IV daily  - continue Seroquel  - d/c'ed levothyroxine yesterday  - Psychology follwoing  - OT (9/28): 9/24 AM-PAC  - PT (9/28): 8/24 AM-PAC  Per Neuro:   - routine EEG: The EEG was abnormal due to the presence of:  1) moderate Generalized slowing which is a non-specific finding consistent with a generalized disturbance of cerebral functioning including toxic, metabolic, or structural abnormalities that are multi-focal or diffuse.   2) intermittent right frontocentral Focal slowing which is consistent with a focal disturbance of cerebral function and an underlying structural lesion should be considered. Movement artifact as etiology for this can't be excluded  - Clinical correlation is recommended. The absence of epileptiform discharges on a single EEG does not rule out a diagnosis of  epilepsy or rule out non-convulsive or complex partial status epilepticus as a cause of altered mental status  - MRI brain w/wo contrast rapidly progressive dementia vs exacerbation of underlying neuro-degenerative process vs Wernicke's encephalopathy  - Delirium precautions   - Echo (9/25/18) Summary:   Left ventricular cavity size is normal with normal left ventricular wall  thickness.   Overall left ventricular systolic function appears hyperdynamic with an  ejection fraction >65%.   No regional wall motion abnormalities   The diastolic dysfunction is indeterminate.   Mild tricuspid regurgitation.   Estimated pulmonary artery systolic pressure is at 33 mmHg assuming a right  atrial pressure of 3 mmHg. Sepsis secondary to colitis vs other  SIRS (3/4) qSOFA (2/3) Lactate 2.21 at presentation that resolved with IVF.  WBC 6.0

## 2018-10-04 LAB — VITAMIN B1 WHOLE BLOOD: 323 NMOL/L (ref 70–180)

## 2018-10-04 NOTE — PROGRESS NOTES
size of effusions with bibasilar atelectasis. Questionable tip of feeding tube at gastroesophageal junction. Single view abdomen recommended      Findings called to patient's floor nurse, Tayla Posada, on 9/30/2018 at 9:17 AM                  XR ABDOMEN (KUB) (SINGLE AP VIEW)   Final Result   Gastric tube coiled in the cardia of the stomach          XR ABDOMEN (KUB) (SINGLE AP VIEW)   Final Result      XR ABDOMEN (KUB) (SINGLE AP VIEW)   Final Result      A Corpak feeding tube is in place with the tip at the level of the gastroesophageal junction. Advancement is recommended. XR CHEST STANDARD (2 VW)   Final Result      Interval development of moderately large right lower lung pleural parenchymal opacity      CT ABDOMEN PELVIS W IV CONTRAST Additional Contrast? None   Final Result   1. Possible mild rectosigmoid colitis. 2.  Cholelithiasis with gallbladder distention. Correlate with    ultrasound. CT CHEST W CONTRAST   Final Result     Small right pleural effusion. Bilateral upper lobe nodules require    follow-up. CT HEAD WO CONTRAST   Final Result      1. No acute intracranial process. Low-density fluid collection in the scalp may represent a chronic hematoma versus cellulitis and edema. 2. Mild cerebral atrophy. 3. Mild chronic small vessel ischemic disease. XR CHEST PORTABLE   Final Result      No acute pulmonary disease. MRI BRAIN W WO CONTRAST    (Results Pending)   MRI BRAIN W WO CONTRAST    (Results Pending)           Assessment/Plan:    Janese Shone, 69 y.o. male w/ PMH of HTN, gout and alcoholism who presents to ED from home via EMS after being found down at home for unknown length of time.      Active Hospital Problems    Diagnosis Date Noted    Pleural effusion [J90] 09/28/2018    Altered mental status [R41.82]     SOB (shortness of breath) [R06.02]     Goals of care, counseling/discussion [Z71.89]     Encounter for palliative care

## 2018-10-04 NOTE — PROGRESS NOTES
size of effusions with bibasilar atelectasis. Questionable tip of feeding tube at gastroesophageal junction. Single view abdomen recommended      Findings called to patient's floor nurse, Zeina Marks, on 9/30/2018 at 9:17 AM                  XR ABDOMEN (KUB) (SINGLE AP VIEW)   Final Result   Gastric tube coiled in the cardia of the stomach          XR ABDOMEN (KUB) (SINGLE AP VIEW)   Final Result      XR ABDOMEN (KUB) (SINGLE AP VIEW)   Final Result      A Corpak feeding tube is in place with the tip at the level of the gastroesophageal junction. Advancement is recommended. XR CHEST STANDARD (2 VW)   Final Result      Interval development of moderately large right lower lung pleural parenchymal opacity      CT ABDOMEN PELVIS W IV CONTRAST Additional Contrast? None   Final Result   1. Possible mild rectosigmoid colitis. 2.  Cholelithiasis with gallbladder distention. Correlate with    ultrasound. CT CHEST W CONTRAST   Final Result     Small right pleural effusion. Bilateral upper lobe nodules require    follow-up. CT HEAD WO CONTRAST   Final Result      1. No acute intracranial process. Low-density fluid collection in the scalp may represent a chronic hematoma versus cellulitis and edema. 2. Mild cerebral atrophy. 3. Mild chronic small vessel ischemic disease. XR CHEST PORTABLE   Final Result      No acute pulmonary disease. MRI BRAIN W WO CONTRAST    (Results Pending)   MRI BRAIN W WO CONTRAST    (Results Pending)           Assessment/Plan:    Mookie Rodriguez, 69 y.o. male w/ PMH of HTN, gout and alcoholism who presents to ED from home via EMS after being found down at home for unknown length of time.      Active Hospital Problems    Diagnosis Date Noted    Pleural effusion [J90] 09/28/2018    Altered mental status [R41.82]     SOB (shortness of breath) [R06.02]     Goals of care, counseling/discussion [Z71.89]     Encounter for palliative care

## 2018-10-21 ENCOUNTER — APPOINTMENT (OUTPATIENT)
Dept: GENERAL RADIOLOGY | Age: 78
End: 2018-10-21
Payer: MEDICARE

## 2018-10-21 ENCOUNTER — APPOINTMENT (OUTPATIENT)
Dept: CT IMAGING | Age: 78
End: 2018-10-21
Payer: MEDICARE

## 2018-10-21 ENCOUNTER — HOSPITAL ENCOUNTER (OUTPATIENT)
Age: 78
Setting detail: OBSERVATION
Discharge: HOSPICE | End: 2018-10-23
Attending: EMERGENCY MEDICINE | Admitting: INTERNAL MEDICINE
Payer: MEDICARE

## 2018-10-21 DIAGNOSIS — R41.82 ALTERED MENTAL STATUS, UNSPECIFIED ALTERED MENTAL STATUS TYPE: ICD-10-CM
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
## 2018-10-21 LAB
ALBUMIN SERPL-MCNC: 2.1 G/DL (ref 3.4–5)
ALBUMIN SERPL-MCNC: 2.2 G/DL (ref 3.4–5)
ALP BLD-CCNC: 117 U/L (ref 40–129)
ALT SERPL-CCNC: 12 U/L (ref 10–40)
ANION GAP SERPL CALCULATED.3IONS-SCNC: 13 MMOL/L (ref 3–16)
AST SERPL-CCNC: 13 U/L (ref 15–37)
BASOPHILS ABSOLUTE: 0 K/UL (ref 0–0.2)
BASOPHILS ABSOLUTE: 0.1 K/UL (ref 0–0.2)
BASOPHILS RELATIVE PERCENT: 0.2 %
BASOPHILS RELATIVE PERCENT: 1 %
BILIRUB SERPL-MCNC: 0.3 MG/DL (ref 0–1)
BILIRUBIN DIRECT: <0.2 MG/DL (ref 0–0.3)
BILIRUBIN, INDIRECT: ABNORMAL MG/DL (ref 0–1)
BUN BLDV-MCNC: 15 MG/DL (ref 7–20)
CALCIUM SERPL-MCNC: 7.1 MG/DL (ref 8.3–10.6)
CHLORIDE BLD-SCNC: 106 MMOL/L (ref 99–110)
CO2: 26 MMOL/L (ref 21–32)
CREAT SERPL-MCNC: 0.9 MG/DL (ref 0.8–1.3)
EOSINOPHILS ABSOLUTE: 0 K/UL (ref 0–0.6)
EOSINOPHILS ABSOLUTE: 0 K/UL (ref 0–0.6)
EOSINOPHILS RELATIVE PERCENT: 0.2 %
EOSINOPHILS RELATIVE PERCENT: 0.3 %
GFR AFRICAN AMERICAN: >60
GFR NON-AFRICAN AMERICAN: >60
GLUCOSE BLD-MCNC: 104 MG/DL (ref 70–99)
HCT VFR BLD CALC: 34.8 % (ref 40.5–52.5)
HCT VFR BLD CALC: 36.5 % (ref 40.5–52.5)
HEMOGLOBIN: 11.6 G/DL (ref 13.5–17.5)
HEMOGLOBIN: 12.2 G/DL (ref 13.5–17.5)
INR BLD: 1.17 (ref 0.86–1.14)
INR BLD: 1.17 (ref 0.86–1.14)
LYMPHOCYTES ABSOLUTE: 0.6 K/UL (ref 1–5.1)
LYMPHOCYTES ABSOLUTE: 0.6 K/UL (ref 1–5.1)
LYMPHOCYTES RELATIVE PERCENT: 5.1 %
LYMPHOCYTES RELATIVE PERCENT: 6.1 %
MCH RBC QN AUTO: 34.7 PG (ref 26–34)
MCH RBC QN AUTO: 34.8 PG (ref 26–34)
MCHC RBC AUTO-ENTMCNC: 33.3 G/DL (ref 31–36)
MCHC RBC AUTO-ENTMCNC: 33.5 G/DL (ref 31–36)
MCV RBC AUTO: 103.8 FL (ref 80–100)
MCV RBC AUTO: 104.3 FL (ref 80–100)
MONOCYTES ABSOLUTE: 0.4 K/UL (ref 0–1.3)
MONOCYTES ABSOLUTE: 0.4 K/UL (ref 0–1.3)
MONOCYTES RELATIVE PERCENT: 3.6 %
MONOCYTES RELATIVE PERCENT: 4 %
NEUTROPHILS ABSOLUTE: 11 K/UL (ref 1.7–7.7)
NEUTROPHILS ABSOLUTE: 9.4 K/UL (ref 1.7–7.7)
NEUTROPHILS RELATIVE PERCENT: 89.4 %
NEUTROPHILS RELATIVE PERCENT: 90.1 %
PDW BLD-RTO: 13.8 % (ref 12.4–15.4)
PDW BLD-RTO: 13.9 % (ref 12.4–15.4)
PHOSPHORUS: 4.4 MG/DL (ref 2.5–4.9)
PLATELET # BLD: 277 K/UL (ref 135–450)
PLATELET # BLD: 293 K/UL (ref 135–450)
PMV BLD AUTO: 7.1 FL (ref 5–10.5)
PMV BLD AUTO: 7.1 FL (ref 5–10.5)
POTASSIUM SERPL-SCNC: 3.7 MMOL/L (ref 3.5–5.1)
PROTHROMBIN TIME: 13.3 SEC (ref 9.8–13)
PROTHROMBIN TIME: 13.3 SEC (ref 9.8–13)
RBC # BLD: 3.33 M/UL (ref 4.2–5.9)
RBC # BLD: 3.52 M/UL (ref 4.2–5.9)
SODIUM BLD-SCNC: 145 MMOL/L (ref 136–145)
TOTAL PROTEIN: 4.6 G/DL (ref 6.4–8.2)
WBC # BLD: 10.5 K/UL (ref 4–11)
WBC # BLD: 12.3 K/UL (ref 4–11)

## 2018-10-21 PROCEDURE — 82140 ASSAY OF AMMONIA: CPT

## 2018-10-21 PROCEDURE — G0378 HOSPITAL OBSERVATION PER HR: HCPCS

## 2018-10-21 PROCEDURE — 85025 COMPLETE CBC W/AUTO DIFF WBC: CPT

## 2018-10-21 PROCEDURE — 80076 HEPATIC FUNCTION PANEL: CPT

## 2018-10-21 PROCEDURE — 84484 ASSAY OF TROPONIN QUANT: CPT

## 2018-10-21 PROCEDURE — 72125 CT NECK SPINE W/O DYE: CPT

## 2018-10-21 PROCEDURE — 82803 BLOOD GASES ANY COMBINATION: CPT

## 2018-10-21 PROCEDURE — 80069 RENAL FUNCTION PANEL: CPT

## 2018-10-21 PROCEDURE — 71045 X-RAY EXAM CHEST 1 VIEW: CPT

## 2018-10-21 PROCEDURE — 36415 COLL VENOUS BLD VENIPUNCTURE: CPT

## 2018-10-21 PROCEDURE — 70450 CT HEAD/BRAIN W/O DYE: CPT

## 2018-10-21 PROCEDURE — 93005 ELECTROCARDIOGRAM TRACING: CPT | Performed by: EMERGENCY MEDICINE

## 2018-10-21 PROCEDURE — 2580000003 HC RX 258: Performed by: INTERNAL MEDICINE

## 2018-10-21 PROCEDURE — 51701 INSERT BLADDER CATHETER: CPT

## 2018-10-21 PROCEDURE — 99285 EMERGENCY DEPT VISIT HI MDM: CPT

## 2018-10-21 PROCEDURE — 83880 ASSAY OF NATRIURETIC PEPTIDE: CPT

## 2018-10-21 PROCEDURE — 85610 PROTHROMBIN TIME: CPT

## 2018-10-21 PROCEDURE — 2580000003 HC RX 258: Performed by: EMERGENCY MEDICINE

## 2018-10-21 PROCEDURE — 96360 HYDRATION IV INFUSION INIT: CPT

## 2018-10-21 PROCEDURE — 83605 ASSAY OF LACTIC ACID: CPT

## 2018-10-21 PROCEDURE — 87040 BLOOD CULTURE FOR BACTERIA: CPT

## 2018-10-21 PROCEDURE — 81003 URINALYSIS AUTO W/O SCOPE: CPT

## 2018-10-21 RX ORDER — POLYETHYLENE GLYCOL 3350 17 G/17G
17 POWDER, FOR SOLUTION ORAL DAILY
Qty: 119 G | Refills: 3 | Status: SHIPPED | OUTPATIENT
Start: 2018-10-21 | End: 2018-10-21

## 2018-10-21 RX ORDER — SODIUM CHLORIDE 0.9 % (FLUSH) 0.9 %
10 SYRINGE (ML) INJECTION PRN
Status: DISCONTINUED | OUTPATIENT
Start: 2018-10-21 | End: 2018-10-23 | Stop reason: HOSPADM

## 2018-10-21 RX ORDER — DEXTROSE AND SODIUM CHLORIDE 5; .45 G/100ML; G/100ML
INJECTION, SOLUTION INTRAVENOUS CONTINUOUS
Status: DISCONTINUED | OUTPATIENT
Start: 2018-10-21 | End: 2018-10-23 | Stop reason: HOSPADM

## 2018-10-21 RX ORDER — THIAMINE MONONITRATE (VIT B1) 100 MG
100 TABLET ORAL DAILY
Status: DISCONTINUED | OUTPATIENT
Start: 2018-10-21 | End: 2018-10-23 | Stop reason: HOSPADM

## 2018-10-21 RX ORDER — 0.9 % SODIUM CHLORIDE 0.9 %
500 INTRAVENOUS SOLUTION INTRAVENOUS ONCE
Status: COMPLETED | OUTPATIENT
Start: 2018-10-21 | End: 2018-10-21

## 2018-10-21 RX ORDER — QUETIAPINE FUMARATE 25 MG/1
25 TABLET, FILM COATED ORAL NIGHTLY
Status: DISCONTINUED | OUTPATIENT
Start: 2018-10-21 | End: 2018-10-21

## 2018-10-21 RX ORDER — DOCUSATE SODIUM 100 MG/1
100 CAPSULE, LIQUID FILLED ORAL 2 TIMES DAILY
Qty: 30 CAPSULE | Refills: 0 | Status: SHIPPED | OUTPATIENT
Start: 2018-10-21 | End: 2018-10-21

## 2018-10-21 RX ORDER — ONDANSETRON 2 MG/ML
4 INJECTION INTRAMUSCULAR; INTRAVENOUS EVERY 6 HOURS PRN
Status: DISCONTINUED | OUTPATIENT
Start: 2018-10-21 | End: 2018-10-23 | Stop reason: HOSPADM

## 2018-10-21 RX ORDER — SODIUM CHLORIDE 0.9 % (FLUSH) 0.9 %
10 SYRINGE (ML) INJECTION EVERY 12 HOURS SCHEDULED
Status: DISCONTINUED | OUTPATIENT
Start: 2018-10-21 | End: 2018-10-23 | Stop reason: HOSPADM

## 2018-10-21 RX ORDER — MULTIVITAMIN WITH FOLIC ACID 400 MCG
1 TABLET ORAL DAILY
Status: DISCONTINUED | OUTPATIENT
Start: 2018-10-21 | End: 2018-10-23 | Stop reason: HOSPADM

## 2018-10-21 RX ORDER — SODIUM CHLORIDE 9 MG/ML
INJECTION, SOLUTION INTRAVENOUS
Status: DISPENSED
Start: 2018-10-21 | End: 2018-10-22

## 2018-10-21 RX ADMIN — SODIUM CHLORIDE 500 ML: 9 INJECTION, SOLUTION INTRAVENOUS at 11:43

## 2018-10-21 RX ADMIN — DEXTROSE AND SODIUM CHLORIDE: 5; 450 INJECTION, SOLUTION INTRAVENOUS at 19:15

## 2018-10-21 ASSESSMENT — PAIN SCALES - PAIN ASSESSMENT IN ADVANCED DEMENTIA (PAINAD)
CONSOLABILITY: 0
TOTALSCORE: 0
CONSOLABILITY: 0
CONSOLABILITY: 0
FACIALEXPRESSION: 0
NEGVOCALIZATION: 0
FACIALEXPRESSION: 0
TOTALSCORE: 0
BREATHING: 0
CONSOLABILITY: 0
BREATHING: 0
NEGVOCALIZATION: 0
BREATHING: 0
BODYLANGUAGE: 0
NEGVOCALIZATION: 0
TOTALSCORE: 0
BREATHING: 0
BREATHING: 0
NEGVOCALIZATION: 0
TOTALSCORE: 0
BODYLANGUAGE: 0
FACIALEXPRESSION: 0
FACIALEXPRESSION: 0
TOTALSCORE: 0
FACIALEXPRESSION: 0
FACIALEXPRESSION: 0
NEGVOCALIZATION: 0
NEGVOCALIZATION: 0
BODYLANGUAGE: 0
TOTALSCORE: 1
BODYLANGUAGE: 0
BREATHING: 0
CONSOLABILITY: 0
CONSOLABILITY: 0
BODYLANGUAGE: 1
BODYLANGUAGE: 0

## 2018-10-21 NOTE — ED PROVIDER NOTES
[address] [facility]          EM RESIDENT NOTE       Date of evaluation: 10/21/2018    Chief Complaint     Fall (Pt arrived via EMS from SNF s/p fall)      History of Present Illness     Jorge Garces is a 69 y.o. male who presents After a fall. The patient's facility reports that the patient was in his wheelchair in the hallway. They then heard a thump and he was on the ground. There is no loss of consciousness. The patient has been at his baseline prior to the fall. However afterwards, EMS reports that the facility said that he does seem somewhat altered. The patient's vital signs were stable during transport. Further history is unable to be obtained from the patient due to his mental status. Review of Systems     ROS:  Unable to be obtained due to the patient's mental status. Past Medical, Surgical, Family, and Social History     He has a past medical history of Encephalopathy; ETOH abuse; Falling; Gout; Hypertension; Rhabdomyolysis; Sepsis ([address] and SOB (shortness of breath). He has no past surgical history on file. His family history is not on file. He reports that he has never smoked. He has never used smokeless tobacco. He reports that he does not drink alcohol or use drugs. Medications     Current Discharge Medication List      CONTINUE these medications which have NOT CHANGED    Details   QUEtiapine (SEROQUEL) 25 MG tablet Take 1 tablet by mouth nightly  Qty: 60 tablet, Refills: 3      vitamin B-1 (THIAMINE) 100 MG tablet Take 1 tablet by mouth daily  Qty: 30 tablet, Refills: 3      allopurinol (ZYLOPRIM) 300 MG tablet Take 300 mg by mouth daily      folic acid (FOLVITE) 1 MG tablet Take 1 mg by mouth daily             Allergies     He has No Known Allergies.     Physical Exam     INITIAL VITALS: BP: 99/68, Temp: 97.4 °F (36.3 °C), Pulse: 121, Resp: 12, SpO2: 94 %     General:  Patient lying on stretcher, no acute distress   HEENT:  Cephalhematoma

## 2018-10-21 NOTE — ED TRIAGE NOTES
Pt arrived via EMS from [facility] [address] s/p fall out of w/c and hit head. Brushing noted on head, pt at baseline with confusion.

## 2018-10-21 NOTE — ED NOTES
Pt going to [id number] report called to [facility] pt to room via stretcher per Yakelin Cruz PCA in stable condition     Felicita Chung, SRINI  10/21/18 2854

## 2018-10-21 NOTE — H&P
pertinent family history. REVIEW OF SYSTEMS:   Pertinent positives as noted in the HPI. All other systems reviewed and negative. PHYSICAL EXAM PERFORMED:    BP 98/74   Pulse 113   Temp 97.3 °F (36.3 °C) (Axillary)   Resp 16   Ht 5' 7\" (1.702 m)   Wt 123 lb 14.4 oz (56.2 kg)   SpO2 96%   BMI 19.41 kg/m²     General appearance: Lethargic and does not open eyes  HEENT:  Normal cephalic, atraumatic without obvious deformity. Pupils equal, round, and reactive to light. Extra ocular muscles intact. Conjunctivae/corneas clear. Neck: Supple, with full range of motion. No jugular venous distention. Trachea midline. Respiratory:  Normal respiratory effort. Clear to auscultation, bilaterally without Rales/Wheezes/Rhonchi. Cardiovascular:  Sinus tachycardia  Abdomen: Soft, non-tender, non-distended with normal bowel sounds. Musculoskeletal:  No clubbing, cyanosis or edema bilaterally. Full range of motion without deformity. Skin: Skin color, texture, turgor normal.  No rashes or lesions. Neurologic:  Neurovascularly intact without any focal sensory/motor deficits. Cranial nerves: II-XII intact, grossly non-focal.  Psychiatric:  somnolent  Capillary Refill: Brisk,< 3 seconds   Peripheral Pulses: +2 palpable, equal bilaterally       Labs:     Recent Labs      10/21/18   1130  10/21/18   1346   WBC  12.3*  10.5   HGB  12.2*  11.6*   HCT  36.5*  34.8*   PLT  293  277     Recent Labs      10/21/18   1130   NA  145   K  3.7   CL  106   CO2  26   BUN  15   CREATININE  0.9   CALCIUM  7.1*   PHOS  4.4     Recent Labs      10/21/18   1346   AST  13*   ALT  12   BILIDIR  <0.2   BILITOT  0.3   ALKPHOS  117     Recent Labs      10/21/18   1130  10/21/18   1346   INR  1.17*  1.17*     No results for input(s): Oliverio Roche in the last 72 hours.     Urinalysis:      Lab Results   Component Value Date    NITRU Negative 09/24/2018    WBCUA 0-2 09/24/2018    RBCUA 3-5 09/24/2018    BLOODU LARGE 09/24/2018    SPECGRAV 1.025 09/24/2018    GLUCOSEU 4.0 09/25/2018       Radiology:         CT CERVICAL SPINE WO CONTRAST   Final Result      No acute bony abnormality of the cervical spine. CT Head WO Contrast   Final Result      No acute hemorrhage      XR CHEST PORTABLE   Final Result      No consolidation          ASSESSMENT/PLAN:    Acute Metabolic encephalopathy-Unclear as to the exact etiology. No obvious infectious source. Could be related to some underlying dehydration. Less likely causes could include partial seizures remote possibility of stroke also needs to be considered. Continue IV fluid hydration  Hold oral medications  Reassess neuro status in the morning  Consider MRI if no improvement  Check B12 folate and TSH  Hold Seroquel for now  Keep NPO as aspiration risk          DVT Prophylaxis: lovenox  Diet: DIET LOW SODIUM 2 GM;  Code Status: Full Code    PT/OT Eval Status:     Dispo - 2-3 d       Marshal Wallis MD    Thank you Tejal Seo for the opportunity to be involved in this patient's care. If you have any questions or concerns please feel free to contact me at [phone].

## 2018-10-22 LAB
A/G RATIO: 0.8 (ref 1.1–2.2)
ALBUMIN SERPL-MCNC: 2 G/DL (ref 3.4–5)
ALP BLD-CCNC: 118 U/L (ref 40–129)
ALT SERPL-CCNC: 12 U/L (ref 10–40)
ANION GAP SERPL CALCULATED.3IONS-SCNC: 9 MMOL/L (ref 3–16)
AST SERPL-CCNC: 14 U/L (ref 15–37)
B-TYPE NATRIURETIC PEPTIDE: 45 PG/ML (ref 0–99.9)
BASE EXCESS VENOUS: 3 (ref -3–3)
BASOPHILS ABSOLUTE: 0 K/UL (ref 0–0.2)
BASOPHILS RELATIVE PERCENT: 0.1 %
BILIRUB SERPL-MCNC: 0.3 MG/DL (ref 0–1)
BILIRUBIN URINE: ABNORMAL MG/DL
BLOOD, URINE: NEGATIVE
BUN BLDV-MCNC: 12 MG/DL (ref 7–20)
CALCIUM IONIZED: 0.92 MMOL/L (ref 1.12–1.32)
CALCIUM SERPL-MCNC: 6.9 MG/DL (ref 8.3–10.6)
CHLORIDE BLD-SCNC: 109 MMOL/L (ref 99–110)
CLARITY: ABNORMAL
CO2: 26 MMOL/L (ref 21–32)
CO2: 28 MMOL/L (ref 21–32)
COLOR: ABNORMAL
CREAT SERPL-MCNC: 0.8 MG/DL (ref 0.8–1.3)
EOSINOPHILS ABSOLUTE: 0 K/UL (ref 0–0.6)
EOSINOPHILS RELATIVE PERCENT: 0.4 %
GFR AFRICAN AMERICAN: >60
GFR AFRICAN AMERICAN: >60
GFR NON-AFRICAN AMERICAN: >60
GFR NON-AFRICAN AMERICAN: >60
GLOBULIN: 2.5 G/DL
GLUCOSE BLD-MCNC: 101 MG/DL (ref 70–99)
GLUCOSE BLD-MCNC: 101 MG/DL (ref 70–99)
GLUCOSE URINE: NEGATIVE MG/DL
HCO3 VENOUS: 28.4 MMOL/L (ref 23–29)
HCT VFR BLD CALC: 36.1 % (ref 40.5–52.5)
HEMOGLOBIN: 12 G/DL (ref 13.5–17.5)
KETONES, URINE: ABNORMAL MG/DL
LACTATE: 1.39 MMOL/L (ref 0.4–2)
LEUKOCYTE ESTERASE, URINE: NEGATIVE
LYMPHOCYTES ABSOLUTE: 0.7 K/UL (ref 1–5.1)
LYMPHOCYTES RELATIVE PERCENT: 6.9 %
MAGNESIUM: 1.2 MG/DL (ref 1.8–2.4)
MCH RBC QN AUTO: 35.1 PG (ref 26–34)
MCHC RBC AUTO-ENTMCNC: 33.3 G/DL (ref 31–36)
MCV RBC AUTO: 105.5 FL (ref 80–100)
MICROSCOPIC EXAMINATION: ABNORMAL
MONOCYTES ABSOLUTE: 0.5 K/UL (ref 0–1.3)
MONOCYTES RELATIVE PERCENT: 4.5 %
NEUTROPHILS ABSOLUTE: 9.1 K/UL (ref 1.7–7.7)
NEUTROPHILS RELATIVE PERCENT: 88.1 %
NITRITE, URINE: NEGATIVE
O2 SAT, VEN: 94 %
PCO2, VEN: 47 MM HG (ref 40–50)
PDW BLD-RTO: 14.5 % (ref 12.4–15.4)
PERFORMED ON: ABNORMAL
PERFORMED ON: NORMAL
PH UA: 5.5
PH VENOUS: 7.39 (ref 7.35–7.45)
PLATELET # BLD: 277 K/UL (ref 135–450)
PMV BLD AUTO: 7.5 FL (ref 5–10.5)
PO2, VEN: 73 MM HG
POC ANION GAP: 14 (ref 10–20)
POC BUN: 14 MG/DL (ref 7–18)
POC CHLORIDE: 105 MMOL/L (ref 99–110)
POC CREATININE: 0.9 MG/DL (ref 0.8–1.3)
POC POTASSIUM: 3.5 MMOL/L (ref 3.5–5.1)
POC SAMPLE TYPE: ABNORMAL
POC SAMPLE TYPE: NORMAL
POC SODIUM: 145 MMOL/L (ref 136–145)
POC TROPONIN I: 0 NG/ML (ref 0–0.1)
POTASSIUM REFLEX MAGNESIUM: 3.3 MMOL/L (ref 3.5–5.1)
PROTEIN UA: NEGATIVE MG/DL
RBC # BLD: 3.42 M/UL (ref 4.2–5.9)
SODIUM BLD-SCNC: 146 MMOL/L (ref 136–145)
SPECIFIC GRAVITY UA: 1.02
TCO2 CALC VENOUS: 30 MMOL/L
TOTAL PROTEIN: 4.5 G/DL (ref 6.4–8.2)
UROBILINOGEN, URINE: 0.2 E.U./DL
WBC # BLD: 10.3 K/UL (ref 4–11)

## 2018-10-22 PROCEDURE — 96365 THER/PROPH/DIAG IV INF INIT: CPT

## 2018-10-22 PROCEDURE — G0378 HOSPITAL OBSERVATION PER HR: HCPCS

## 2018-10-22 PROCEDURE — 96375 TX/PRO/DX INJ NEW DRUG ADDON: CPT

## 2018-10-22 PROCEDURE — 90670 PCV13 VACCINE IM: CPT | Performed by: INTERNAL MEDICINE

## 2018-10-22 PROCEDURE — 36415 COLL VENOUS BLD VENIPUNCTURE: CPT

## 2018-10-22 PROCEDURE — 2580000003 HC RX 258: Performed by: INTERNAL MEDICINE

## 2018-10-22 PROCEDURE — G0009 ADMIN PNEUMOCOCCAL VACCINE: HCPCS | Performed by: INTERNAL MEDICINE

## 2018-10-22 PROCEDURE — 85025 COMPLETE CBC W/AUTO DIFF WBC: CPT

## 2018-10-22 PROCEDURE — 83735 ASSAY OF MAGNESIUM: CPT

## 2018-10-22 PROCEDURE — 99221 1ST HOSP IP/OBS SF/LOW 40: CPT | Performed by: NURSE PRACTITIONER

## 2018-10-22 PROCEDURE — 96372 THER/PROPH/DIAG INJ SC/IM: CPT

## 2018-10-22 PROCEDURE — G0008 ADMIN INFLUENZA VIRUS VAC: HCPCS | Performed by: INTERNAL MEDICINE

## 2018-10-22 PROCEDURE — 6370000000 HC RX 637 (ALT 250 FOR IP): Performed by: INTERNAL MEDICINE

## 2018-10-22 PROCEDURE — 6360000002 HC RX W HCPCS: Performed by: INTERNAL MEDICINE

## 2018-10-22 PROCEDURE — 6360000002 HC RX W HCPCS: Performed by: FAMILY MEDICINE

## 2018-10-22 PROCEDURE — 90686 IIV4 VACC NO PRSV 0.5 ML IM: CPT | Performed by: INTERNAL MEDICINE

## 2018-10-22 PROCEDURE — 80053 COMPREHEN METABOLIC PANEL: CPT

## 2018-10-22 PROCEDURE — 6370000000 HC RX 637 (ALT 250 FOR IP): Performed by: FAMILY MEDICINE

## 2018-10-22 PROCEDURE — 96366 THER/PROPH/DIAG IV INF ADDON: CPT

## 2018-10-22 RX ORDER — MAGNESIUM SULFATE IN WATER 40 MG/ML
2 INJECTION, SOLUTION INTRAVENOUS ONCE
Status: COMPLETED | OUTPATIENT
Start: 2018-10-22 | End: 2018-10-22

## 2018-10-22 RX ORDER — CASTOR OIL AND BALSAM, PERU 788; 87 MG/G; MG/G
OINTMENT TOPICAL 2 TIMES DAILY
Status: DISCONTINUED | OUTPATIENT
Start: 2018-10-22 | End: 2018-10-23 | Stop reason: HOSPADM

## 2018-10-22 RX ORDER — LORAZEPAM 2 MG/ML
0.5 INJECTION INTRAMUSCULAR EVERY 6 HOURS PRN
Status: DISCONTINUED | OUTPATIENT
Start: 2018-10-22 | End: 2018-10-23 | Stop reason: HOSPADM

## 2018-10-22 RX ORDER — QUETIAPINE FUMARATE 25 MG/1
25 TABLET, FILM COATED ORAL NIGHTLY
Status: DISCONTINUED | OUTPATIENT
Start: 2018-10-22 | End: 2018-10-23 | Stop reason: HOSPADM

## 2018-10-22 RX ADMIN — QUETIAPINE FUMARATE 25 MG: 25 TABLET ORAL at 21:58

## 2018-10-22 RX ADMIN — MICONAZOLE NITRATE: 20 CREAM TOPICAL at 22:01

## 2018-10-22 RX ADMIN — INFLUENZA A VIRUS A/MICHIGAN/45/2015 X-275 (H1N1) ANTIGEN (FORMALDEHYDE INACTIVATED), INFLUENZA A VIRUS A/SINGAPORE/INFIMH-16-0019/2016 IVR-186 (H3N2) ANTIGEN (FORMALDEHYDE INACTIVATED), INFLUENZA B VIRUS B/PHUKET/3073/2013 ANTIGEN (FORMALDEHYDE INACTIVATED), AND INFLUENZA B VIRUS B/MARYLAND/15/2016 BX-69A ANTIGEN (FORMALDEHYDE INACTIVATED) 0.5 ML: 15; 15; 15; 15 INJECTION, SUSPENSION INTRAMUSCULAR at 17:15

## 2018-10-22 RX ADMIN — DEXTROSE AND SODIUM CHLORIDE: 5; 450 INJECTION, SOLUTION INTRAVENOUS at 08:00

## 2018-10-22 RX ADMIN — PNEUMOCOCCAL 13-VALENT CONJUGATE VACCINE 0.5 ML: 2.2; 2.2; 2.2; 2.2; 2.2; 4.4; 2.2; 2.2; 2.2; 2.2; 2.2; 2.2; 2.2 INJECTION, SUSPENSION INTRAMUSCULAR at 17:16

## 2018-10-22 RX ADMIN — DEXTROSE AND SODIUM CHLORIDE: 5; 450 INJECTION, SOLUTION INTRAVENOUS at 18:19

## 2018-10-22 RX ADMIN — LORAZEPAM 0.5 MG: 2 INJECTION INTRAMUSCULAR; INTRAVENOUS at 21:57

## 2018-10-22 RX ADMIN — ENOXAPARIN SODIUM 40 MG: 40 INJECTION SUBCUTANEOUS at 08:01

## 2018-10-22 RX ADMIN — MAGNESIUM SULFATE HEPTAHYDRATE 2 G: 40 INJECTION, SOLUTION INTRAVENOUS at 12:09

## 2018-10-22 RX ADMIN — CASTOR OIL AND BALSAM, PERU: 788; 87 OINTMENT TOPICAL at 22:01

## 2018-10-22 ASSESSMENT — PAIN SCALES - PAIN ASSESSMENT IN ADVANCED DEMENTIA (PAINAD)
CONSOLABILITY: 0
TOTALSCORE: 0
BODYLANGUAGE: 0
FACIALEXPRESSION: 0
BREATHING: 0
BREATHING: 0
TOTALSCORE: 0
NEGVOCALIZATION: 0
TOTALSCORE: 0
BREATHING: 0
BREATHING: 0
CONSOLABILITY: 0
FACIALEXPRESSION: 0
BODYLANGUAGE: 0
NEGVOCALIZATION: 0
NEGVOCALIZATION: 0
CONSOLABILITY: 0
TOTALSCORE: 0
FACIALEXPRESSION: 0
NEGVOCALIZATION: 0
BREATHING: 0
FACIALEXPRESSION: 0
BODYLANGUAGE: 0
TOTALSCORE: 0
FACIALEXPRESSION: 0
BODYLANGUAGE: 0
BREATHING: 0
FACIALEXPRESSION: 0
CONSOLABILITY: 0
BODYLANGUAGE: 0
TOTALSCORE: 0
TOTALSCORE: 0
BODYLANGUAGE: 0
BODYLANGUAGE: 0
FACIALEXPRESSION: 0
BODYLANGUAGE: 0
NEGVOCALIZATION: 0
NEGVOCALIZATION: 0
CONSOLABILITY: 0
BODYLANGUAGE: 0
BODYLANGUAGE: 0
BREATHING: 0
NEGVOCALIZATION: 0
NEGVOCALIZATION: 0
TOTALSCORE: 0
TOTALSCORE: 0
CONSOLABILITY: 0
BREATHING: 0
NEGVOCALIZATION: 0
NEGVOCALIZATION: 0
CONSOLABILITY: 0
TOTALSCORE: 0
FACIALEXPRESSION: 0
FACIALEXPRESSION: 0
BREATHING: 0
FACIALEXPRESSION: 0
BREATHING: 0

## 2018-10-22 ASSESSMENT — PAIN SCALES - GENERAL
PAINLEVEL_OUTOF10: 0

## 2018-10-23 VITALS
TEMPERATURE: 97.2 F | DIASTOLIC BLOOD PRESSURE: 54 MMHG | WEIGHT: 123.9 LBS | HEIGHT: 67 IN | RESPIRATION RATE: 16 BRPM | SYSTOLIC BLOOD PRESSURE: 93 MMHG | HEART RATE: 111 BPM | OXYGEN SATURATION: 94 % | BODY MASS INDEX: 19.45 KG/M2

## 2018-10-23 PROCEDURE — G0378 HOSPITAL OBSERVATION PER HR: HCPCS

## 2018-10-23 PROCEDURE — 2580000003 HC RX 258: Performed by: INTERNAL MEDICINE

## 2018-10-23 PROCEDURE — 96372 THER/PROPH/DIAG INJ SC/IM: CPT

## 2018-10-23 PROCEDURE — 6360000002 HC RX W HCPCS: Performed by: INTERNAL MEDICINE

## 2018-10-23 RX ADMIN — ENOXAPARIN SODIUM 40 MG: 40 INJECTION SUBCUTANEOUS at 07:49

## 2018-10-23 RX ADMIN — MICONAZOLE NITRATE: 20 CREAM TOPICAL at 07:46

## 2018-10-23 RX ADMIN — DEXTROSE AND SODIUM CHLORIDE: 5; 450 INJECTION, SOLUTION INTRAVENOUS at 10:00

## 2018-10-23 RX ADMIN — CASTOR OIL AND BALSAM, PERU: 788; 87 OINTMENT TOPICAL at 07:46

## 2018-10-23 ASSESSMENT — PAIN SCALES - PAIN ASSESSMENT IN ADVANCED DEMENTIA (PAINAD)
CONSOLABILITY: 0
NEGVOCALIZATION: 0
CONSOLABILITY: 0
BODYLANGUAGE: 0
BREATHING: 0
CONSOLABILITY: 0
BREATHING: 0
TOTALSCORE: 0
CONSOLABILITY: 0
TOTALSCORE: 0
FACIALEXPRESSION: 0
BREATHING: 0
BREATHING: 0
TOTALSCORE: 0
CONSOLABILITY: 0
FACIALEXPRESSION: 0
BODYLANGUAGE: 0
NEGVOCALIZATION: 0
FACIALEXPRESSION: 0
NEGVOCALIZATION: 0
BREATHING: 0
NEGVOCALIZATION: 0
BREATHING: 0
BODYLANGUAGE: 0
BODYLANGUAGE: 0
FACIALEXPRESSION: 0
FACIALEXPRESSION: 0
NEGVOCALIZATION: 0
TOTALSCORE: 0
NEGVOCALIZATION: 0
BODYLANGUAGE: 0
CONSOLABILITY: 0
FACIALEXPRESSION: 0
BODYLANGUAGE: 0

## 2018-10-23 ASSESSMENT — PAIN SCALES - GENERAL
PAINLEVEL_OUTOF10: 0
PAINLEVEL_OUTOF10: 0

## 2018-10-23 NOTE — DISCHARGE SUMMARY
Details   QUEtiapine (SEROQUEL) 25 MG tablet Take 1 tablet by mouth nightly  Qty: 60 tablet, Refills: 3      vitamin B-1 (THIAMINE) 100 MG tablet Take 1 tablet by mouth daily  Qty: 30 tablet, Refills: 3      allopurinol (ZYLOPRIM) 300 MG tablet Take 300 mg by mouth daily      folic acid (FOLVITE) 1 MG tablet Take 1 mg by mouth daily             Time Spent on discharge is more than 20 minutes in the examination, evaluation, counseling and review of medications and discharge plan. Signed:    Hannah Ramírez MD   10/23/2018      Thank you Yamileth Fairly for the opportunity to be involved in this patient's care. If you have any questions or concerns please feel free to contact me at [phone].

## 2018-10-26 LAB
BLOOD CULTURE, ROUTINE: NORMAL
CULTURE, BLOOD 2: NORMAL
EKG ATRIAL RATE: 122 BPM
EKG DIAGNOSIS: NORMAL
EKG P AXIS: 75 DEGREES
EKG P-R INTERVAL: 130 MS
EKG Q-T INTERVAL: 332 MS
EKG QRS DURATION: 54 MS
EKG QTC CALCULATION (BAZETT): 473 MS
EKG R AXIS: 81 DEGREES
EKG T AXIS: 88 DEGREES
EKG VENTRICULAR RATE: 122 BPM

## 2018-12-12 NOTE — PROGRESS NOTES
Hospitalist Progress Note    Patient:  Eryn Jackson      Unit/Bed:4304/4304-01    YOB: 1940    MRN: [id number]       Acct: [de-identified]     PCP: Naye Paris    Date of Admission: 10/21/2018    Chief Complaint: ms changes    Hospital Course: 68 yo male sent to hospital from SNF with ms changes  Hx of ETOH related dementia  Was last dc from [facility] 10/3 to [facility]    Subjective: remains somnolent. Not very arousable. Some mumbling but incoherent      Medications:  Reviewed    Infusion Medications    dextrose 5 % and 0.45 % NaCl 125 mL/hr at 10/22/18 1819     Scheduled Medications    VENELEX   Topical BID    miconazole   Topical BID    sodium chloride flush  10 mL Intravenous 2 times per day    enoxaparin  40 mg Subcutaneous Daily    thiamine  100 mg Oral Daily    multivitamin  1 tablet Oral Daily     PRN Meds: sodium chloride flush, magnesium hydroxide, ondansetron      Intake/Output Summary (Last 24 hours) at 10/22/18 1916  Last data filed at 10/22/18 1648   Gross per 24 hour   Intake              366 ml   Output                0 ml   Net              366 ml       Diet:  Diet NPO Effective Now    Exam:  /79   Pulse 114   Temp 97.5 °F (36.4 °C) (Axillary)   Resp 14   Ht 5' 7\" (1.702 m)   Wt 123 lb 14.4 oz (56.2 kg)   SpO2 91%   BMI 19.41 kg/m²     General appearance:lethargic male. Poorly coherent  HEENT: Pupils equal, round, and reactive to light. Conjunctivae/corneas clear. Oral cavity:crusting of mouth  Neck: Supple, with full range of motion. No jugular venous distention. Trachea midline. Respiratory:  Normal respiratory effort. Clear to auscultation, bilaterally without Rales/Wheezes/Rhonchi. Cardiovascular: Regular rate and rhythm with normal S1/S2 without murmurs, rubs or gallops. Abdomen: Soft, non-tender, non-distended with normal bowel sounds. Musculoskeletal: passive and active ROM x 4 extremities.   Skin: Skin color, texture, turgor normal.  No rashes or Benign prostatic hyperplasia with urinary retention